# Patient Record
Sex: FEMALE | Race: BLACK OR AFRICAN AMERICAN | NOT HISPANIC OR LATINO | URBAN - METROPOLITAN AREA
[De-identification: names, ages, dates, MRNs, and addresses within clinical notes are randomized per-mention and may not be internally consistent; named-entity substitution may affect disease eponyms.]

---

## 2022-10-27 ENCOUNTER — INPATIENT (INPATIENT)
Facility: HOSPITAL | Age: 87
LOS: 5 days | Discharge: NURSING, REHAB OR LONG-TERM CARE | DRG: 682 | End: 2022-11-02
Attending: INTERNAL MEDICINE | Admitting: INTERNAL MEDICINE
Payer: COMMERCIAL

## 2022-10-27 VITALS
HEIGHT: 60.5 IN | WEIGHT: 182.1 LBS | OXYGEN SATURATION: 93 % | TEMPERATURE: 98 F | SYSTOLIC BLOOD PRESSURE: 114 MMHG | HEART RATE: 69 BPM | DIASTOLIC BLOOD PRESSURE: 69 MMHG | RESPIRATION RATE: 20 BRPM

## 2022-10-27 DIAGNOSIS — R41.82 ALTERED MENTAL STATUS, UNSPECIFIED: ICD-10-CM

## 2022-10-27 LAB
ALBUMIN SERPL ELPH-MCNC: 1.3 G/DL — LOW (ref 3.3–5)
ALBUMIN SERPL ELPH-MCNC: 3.1 G/DL — LOW (ref 3.3–5)
ALP SERPL-CCNC: 37 U/L — LOW (ref 40–120)
ALP SERPL-CCNC: 83 U/L — SIGNIFICANT CHANGE UP (ref 40–120)
ALT FLD-CCNC: 8 U/L — LOW (ref 10–45)
ALT FLD-CCNC: <5 U/L — LOW (ref 10–45)
ANION GAP SERPL CALC-SCNC: 11 MMOL/L — SIGNIFICANT CHANGE UP (ref 5–17)
ANION GAP SERPL CALC-SCNC: 8 MMOL/L — SIGNIFICANT CHANGE UP (ref 5–17)
APPEARANCE UR: CLEAR — SIGNIFICANT CHANGE UP
AST SERPL-CCNC: 17 U/L — SIGNIFICANT CHANGE UP (ref 10–40)
AST SERPL-CCNC: 9 U/L — LOW (ref 10–40)
BACTERIA # UR AUTO: NEGATIVE — SIGNIFICANT CHANGE UP
BASE EXCESS BLDV CALC-SCNC: 3.3 MMOL/L — HIGH (ref -2–3)
BASOPHILS # BLD AUTO: 0.05 K/UL — SIGNIFICANT CHANGE UP (ref 0–0.2)
BASOPHILS NFR BLD AUTO: 0.9 % — SIGNIFICANT CHANGE UP (ref 0–2)
BILIRUB SERPL-MCNC: 0.2 MG/DL — SIGNIFICANT CHANGE UP (ref 0.2–1.2)
BILIRUB SERPL-MCNC: <0.1 MG/DL — LOW (ref 0.2–1.2)
BILIRUB UR-MCNC: NEGATIVE — SIGNIFICANT CHANGE UP
BUN SERPL-MCNC: 32 MG/DL — HIGH (ref 7–23)
BUN SERPL-MCNC: 57 MG/DL — HIGH (ref 7–23)
CA-I SERPL-SCNC: 1.3 MMOL/L — SIGNIFICANT CHANGE UP (ref 1.15–1.33)
CALCIUM SERPL-MCNC: 4.4 MG/DL — CRITICAL LOW (ref 8.4–10.5)
CALCIUM SERPL-MCNC: 9.7 MG/DL — SIGNIFICANT CHANGE UP (ref 8.4–10.5)
CHLORIDE BLDV-SCNC: 98 MMOL/L — SIGNIFICANT CHANGE UP (ref 96–108)
CHLORIDE SERPL-SCNC: 122 MMOL/L — HIGH (ref 96–108)
CHLORIDE SERPL-SCNC: 97 MMOL/L — SIGNIFICANT CHANGE UP (ref 96–108)
CO2 BLDV-SCNC: 32 MMOL/L — HIGH (ref 22–26)
CO2 SERPL-SCNC: 14 MMOL/L — LOW (ref 22–31)
CO2 SERPL-SCNC: 26 MMOL/L — SIGNIFICANT CHANGE UP (ref 22–31)
COLOR SPEC: SIGNIFICANT CHANGE UP
CREAT SERPL-MCNC: 1.04 MG/DL — SIGNIFICANT CHANGE UP (ref 0.5–1.3)
CREAT SERPL-MCNC: 2.02 MG/DL — HIGH (ref 0.5–1.3)
DIFF PNL FLD: NEGATIVE — SIGNIFICANT CHANGE UP
EGFR: 23 ML/MIN/1.73M2 — LOW
EGFR: 51 ML/MIN/1.73M2 — LOW
EOSINOPHIL # BLD AUTO: 0 K/UL — SIGNIFICANT CHANGE UP (ref 0–0.5)
EOSINOPHIL NFR BLD AUTO: 0 % — SIGNIFICANT CHANGE UP (ref 0–6)
EPI CELLS # UR: 3 /HPF — SIGNIFICANT CHANGE UP
GAS PNL BLDV: 133 MMOL/L — LOW (ref 136–145)
GAS PNL BLDV: SIGNIFICANT CHANGE UP
GAS PNL BLDV: SIGNIFICANT CHANGE UP
GLUCOSE BLDV-MCNC: 95 MG/DL — SIGNIFICANT CHANGE UP (ref 70–99)
GLUCOSE SERPL-MCNC: 108 MG/DL — HIGH (ref 70–99)
GLUCOSE SERPL-MCNC: 52 MG/DL — CRITICAL LOW (ref 70–99)
GLUCOSE UR QL: NEGATIVE — SIGNIFICANT CHANGE UP
HCO3 BLDV-SCNC: 30 MMOL/L — HIGH (ref 22–29)
HCT VFR BLD CALC: 31.1 % — LOW (ref 34.5–45)
HCT VFR BLD CALC: 32.1 % — LOW (ref 34.5–45)
HCT VFR BLDA CALC: 32 % — LOW (ref 34.5–46.5)
HGB BLD CALC-MCNC: 10.5 G/DL — LOW (ref 11.7–16.1)
HGB BLD-MCNC: 10.1 G/DL — LOW (ref 11.5–15.5)
HGB BLD-MCNC: 9.7 G/DL — LOW (ref 11.5–15.5)
HYALINE CASTS # UR AUTO: 1 /LPF — SIGNIFICANT CHANGE UP (ref 0–2)
KETONES UR-MCNC: NEGATIVE — SIGNIFICANT CHANGE UP
LACTATE BLDV-MCNC: 2 MMOL/L — SIGNIFICANT CHANGE UP (ref 0.5–2)
LEUKOCYTE ESTERASE UR-ACNC: ABNORMAL
LYMPHOCYTES # BLD AUTO: 0.65 K/UL — LOW (ref 1–3.3)
LYMPHOCYTES # BLD AUTO: 12.9 % — LOW (ref 13–44)
MANUAL SMEAR VERIFICATION: SIGNIFICANT CHANGE UP
MCHC RBC-ENTMCNC: 31.2 GM/DL — LOW (ref 32–36)
MCHC RBC-ENTMCNC: 31.5 GM/DL — LOW (ref 32–36)
MCHC RBC-ENTMCNC: 34.2 PG — HIGH (ref 27–34)
MCHC RBC-ENTMCNC: 34.3 PG — HIGH (ref 27–34)
MCV RBC AUTO: 108.8 FL — HIGH (ref 80–100)
MCV RBC AUTO: 109.9 FL — HIGH (ref 80–100)
MONOCYTES # BLD AUTO: 0.96 K/UL — HIGH (ref 0–0.9)
MONOCYTES NFR BLD AUTO: 19 % — HIGH (ref 2–14)
NEUTROPHILS # BLD AUTO: 3.39 K/UL — SIGNIFICANT CHANGE UP (ref 1.8–7.4)
NEUTROPHILS NFR BLD AUTO: 67.2 % — SIGNIFICANT CHANGE UP (ref 43–77)
NITRITE UR-MCNC: NEGATIVE — SIGNIFICANT CHANGE UP
NRBC # BLD: 0 /100 WBCS — SIGNIFICANT CHANGE UP (ref 0–0)
PCO2 BLDV: 54 MMHG — HIGH (ref 39–42)
PH BLDV: 7.35 — SIGNIFICANT CHANGE UP (ref 7.32–7.43)
PH UR: 6.5 — SIGNIFICANT CHANGE UP (ref 5–8)
PLAT MORPH BLD: NORMAL — SIGNIFICANT CHANGE UP
PLATELET # BLD AUTO: 315 K/UL — SIGNIFICANT CHANGE UP (ref 150–400)
PLATELET # BLD AUTO: 338 K/UL — SIGNIFICANT CHANGE UP (ref 150–400)
PO2 BLDV: 25 MMHG — SIGNIFICANT CHANGE UP (ref 25–45)
POTASSIUM BLDV-SCNC: 4.1 MMOL/L — SIGNIFICANT CHANGE UP (ref 3.5–5.1)
POTASSIUM SERPL-MCNC: 2.1 MMOL/L — CRITICAL LOW (ref 3.5–5.3)
POTASSIUM SERPL-MCNC: 4.4 MMOL/L — SIGNIFICANT CHANGE UP (ref 3.5–5.3)
POTASSIUM SERPL-SCNC: 2.1 MMOL/L — CRITICAL LOW (ref 3.5–5.3)
POTASSIUM SERPL-SCNC: 4.4 MMOL/L — SIGNIFICANT CHANGE UP (ref 3.5–5.3)
PROT SERPL-MCNC: 3.3 G/DL — LOW (ref 6–8.3)
PROT SERPL-MCNC: 6.9 G/DL — SIGNIFICANT CHANGE UP (ref 6–8.3)
PROT UR-MCNC: SIGNIFICANT CHANGE UP
RAPID RVP RESULT: SIGNIFICANT CHANGE UP
RBC # BLD: 2.83 M/UL — LOW (ref 3.8–5.2)
RBC # BLD: 2.95 M/UL — LOW (ref 3.8–5.2)
RBC # FLD: 13.6 % — SIGNIFICANT CHANGE UP (ref 10.3–14.5)
RBC # FLD: 13.8 % — SIGNIFICANT CHANGE UP (ref 10.3–14.5)
RBC BLD AUTO: SIGNIFICANT CHANGE UP
RBC CASTS # UR COMP ASSIST: 5 /HPF — HIGH (ref 0–4)
SAO2 % BLDV: 31.9 % — LOW (ref 67–88)
SARS-COV-2 RNA SPEC QL NAA+PROBE: SIGNIFICANT CHANGE UP
SODIUM SERPL-SCNC: 134 MMOL/L — LOW (ref 135–145)
SODIUM SERPL-SCNC: 144 MMOL/L — SIGNIFICANT CHANGE UP (ref 135–145)
SP GR SPEC: 1.01 — SIGNIFICANT CHANGE UP (ref 1.01–1.02)
TROPONIN T, HIGH SENSITIVITY RESULT: 23 NG/L — SIGNIFICANT CHANGE UP (ref 0–51)
UROBILINOGEN FLD QL: NEGATIVE — SIGNIFICANT CHANGE UP
WBC # BLD: 5.04 K/UL — SIGNIFICANT CHANGE UP (ref 3.8–10.5)
WBC # BLD: 5.57 K/UL — SIGNIFICANT CHANGE UP (ref 3.8–10.5)
WBC # FLD AUTO: 5.04 K/UL — SIGNIFICANT CHANGE UP (ref 3.8–10.5)
WBC # FLD AUTO: 5.57 K/UL — SIGNIFICANT CHANGE UP (ref 3.8–10.5)
WBC UR QL: 3 /HPF — SIGNIFICANT CHANGE UP (ref 0–5)

## 2022-10-27 PROCEDURE — 93010 ELECTROCARDIOGRAM REPORT: CPT

## 2022-10-27 PROCEDURE — 99223 1ST HOSP IP/OBS HIGH 75: CPT

## 2022-10-27 PROCEDURE — 70450 CT HEAD/BRAIN W/O DYE: CPT | Mod: 26,MA

## 2022-10-27 PROCEDURE — 71045 X-RAY EXAM CHEST 1 VIEW: CPT | Mod: 26

## 2022-10-27 PROCEDURE — 99285 EMERGENCY DEPT VISIT HI MDM: CPT

## 2022-10-27 RX ORDER — DEXTROSE 50 % IN WATER 50 %
50 SYRINGE (ML) INTRAVENOUS ONCE
Refills: 0 | Status: DISCONTINUED | OUTPATIENT
Start: 2022-10-27 | End: 2022-10-27

## 2022-10-27 RX ORDER — POTASSIUM CHLORIDE 20 MEQ
10 PACKET (EA) ORAL
Refills: 0 | Status: DISCONTINUED | OUTPATIENT
Start: 2022-10-27 | End: 2022-10-27

## 2022-10-27 RX ORDER — POTASSIUM CHLORIDE 20 MEQ
40 PACKET (EA) ORAL ONCE
Refills: 0 | Status: DISCONTINUED | OUTPATIENT
Start: 2022-10-27 | End: 2022-10-27

## 2022-10-27 RX ORDER — MAGNESIUM SULFATE 500 MG/ML
2 VIAL (ML) INJECTION ONCE
Refills: 0 | Status: DISCONTINUED | OUTPATIENT
Start: 2022-10-27 | End: 2022-10-27

## 2022-10-27 RX ORDER — INFLUENZA VIRUS VACCINE 15; 15; 15; 15 UG/.5ML; UG/.5ML; UG/.5ML; UG/.5ML
0.7 SUSPENSION INTRAMUSCULAR ONCE
Refills: 0 | Status: DISCONTINUED | OUTPATIENT
Start: 2022-10-27 | End: 2022-11-02

## 2022-10-27 NOTE — ED PROVIDER NOTE - PHYSICAL EXAMINATION
GENERAL: Not in acute distress, non-toxic appearing  HEAD: normocephalic, atraumatic  HEENT: PERRLA, EOMI, normal conjunctiva, oral mucosa moist, neck supple  CARDIAC: regular rate and rhythm, normal S1 and S2,  no appreciable murmurs  PULM: clear to ascultation bilaterally, no crackles, rales, rhonchi, or wheezing  GI: abdomen nondistended, soft, nontender, no guarding or rebound tenderness  : no suprapubic tenderness  NEURO: alert and oriented x 3, normal speech, no focal motor or sensory deficits, gait normal, no gross neurologic deficit  MSK: No visible deformities, no peripheral edema, calf tenderness/redness/swelling  SKIN: No visible rashes, dry, well-perfused  PSYCH: appropriate mood and affect

## 2022-10-27 NOTE — ED ADULT TRIAGE NOTE - CHIEF COMPLAINT QUOTE
As per EMS, pt was at [facility] after having [facility] and pt was seen shaking and they were concerned pt was having a seizure. EMS states only pt's leg was shaking

## 2022-10-27 NOTE — ED ADULT NURSE NOTE - NSIMPLEMENTINTERV_GEN_ALL_ED
Implemented All Fall with Harm Risk Interventions:  [place] to call system. Call bell, personal items and telephone within reach. Instruct patient to call for assistance. Room bathroom lighting operational. Non-slip footwear when patient is off stretcher. Physically safe environment: no spills, clutter or unnecessary equipment. Stretcher in lowest position, wheels locked, appropriate side rails in place. Provide visual cue, wrist band, yellow gown, etc. Monitor gait and stability. Monitor for mental status changes and reorient to person, place, and time. Review medications for side effects contributing to fall risk. Reinforce activity limits and safety measures with patient and family. Provide visual clues: red socks.

## 2022-10-27 NOTE — ED PROVIDER NOTE - CLINICAL SUMMARY MEDICAL DECISION MAKING FREE TEXT BOX
89 year old female with hx of paroxysmal AFib with a pacemaker, CHF, PAD, aortic valve stenosis, and recent knee surgery on 10/8 comes into the ED with her daughter from her facility where she was found to have "seizure-like activity". Pt did not seem to have a postictal state and neuro exam here is not significant. Pt's abd pain seems to have been present for the past ~1week. Will get a CBC, CMP, UA, VBG, CT head, UA, Urine culture, chest xray. If work up is negative, Pt can be dc back to facility. 89 year old female with hx of paroxysmal AFib with a pacemaker CAD , CHF, PAD, aortic valve stenosis, and recent knee surgery on 10/8 comes into the ED with her daughter from her facility where she was found to have "seizure-like activity". Pt did not seem to have a postictal state and neuro exam here is not significant. Pt's abd pain seems to have been present for the past ~1week.  concern for TIA vs  syncope Will get a CBC, CMP, UA, VBG, CT head, UA, Urine culture, chest xray. If work up is negative, Pt can be dc back to facility.ZR

## 2022-10-27 NOTE — H&P ADULT - HISTORY OF PRESENT ILLNESS
89 year old female with hx of paroxysmal AFib with a pacemaker, CHF, PAD, aortic valve stenosis, and recent knee surgery on 10/8  89 year old female with hx of paroxysmal AFib, HFrEF, sinus arrest s/p pacemaker, PAD, CAD stage 3a, aortic valve stenosis, and recent hospitalization for infected prosthetic knee joint who presents from rehab facility for          Per outpatient records (see [facility]), the patient was hospitalized earlier this month after a syncopal episode, and was found to have infected prosthetic knee joint requiring left knee I&D with poly exchange. She was treated with a course of vanc and zosyn, eventually discharged to [facility]. 89 year old female with hx of paroxysmal AFib, HFrEF, sinus arrest s/p pacemaker, PAD, aortic valve stenosis, and recent hospitalization for infected prosthetic knee joint who presents from [facility] rehab facility for concern of seizure activity. According to provider available at [facility] for rehab, the patient was sent to the hospital after an episode of limited responsiveness associated with jerking movements of the limbs. This episode lasted for about five minutes.     The patient herself did not recall the events that lead to her admissions. She states she was at the rehab facility, waiting to be transported to an outpatient appointment when she was brought to the hospital instead. She repeated states that 'it wasn't fair' that she was brought here instead without her personal belongings. She denies losing consciousness at any point, no confusion, no headaches.     The patient was AAOx2 at the time of evaluation. She could not recall the name of her rehab facility and didn't recall the name of the hospital where she recently had knee I&D. Per rehab facility, the patient is typically AAOx3. I attempted to call the patient's daughter as well for collateral; however, phone went straight to voicemail.    Per outpatient records (see [facility]), the patient was hospitalized earlier this month after a syncopal episode, and was found to have infected prosthetic knee joint requiring left knee I&D with poly exchange. She was treated with a course of vanc and zosyn, eventually discharged to [facility]. 89 year old female with hx of paroxysmal AFib, HFrEF, sinus arrest s/p pacemaker, PAD, aortic valve stenosis, and recent hospitalization for infected prosthetic knee joint who presents from [facility] rehab facility for concern of seizure activity. According to provider available at [facility] for rehab, the patient was sent to the hospital after an episode of limited responsiveness associated with jerking movements of the limbs. This episode lasted for about five minutes.     The patient herself did not recall the events that lead to her admissions. She states she was at the rehab facility, waiting to be transported to an outpatient appointment when she was brought to the hospital instead. She repeated states that 'it wasn't fair' that she was brought here instead without her personal belongings. She denies losing consciousness at any point, no confusion, no headaches.     The patient was AAOx2 at the time of evaluation. She could not recall the name of her rehab facility and didn't recall the name of the hospital where she recently had knee I&D. Per [facility] rehab facility, the patient is typically AAOx3. I attempted to call the patient's daughter as well for collateral; however, phone went straight to voicemail.    Per outpatient records (see [facility]), the patient was hospitalized earlier this month after a syncopal episode, and was found to have infected prosthetic knee joint requiring left knee I&D with poly exchange. She was treated with a course of vanc and zosyn, eventually discharged to [facility].

## 2022-10-27 NOTE — ED PROVIDER NOTE - NS ED ROS FT
GENERAL: No fever, chills  HEENT: No cough, congestion,   CARDIAC: No chest pain, palpitations, lightheadedness, syncope  PULM: No dyspnea, cough, wheezing   GI: +abdominal pain, No nausea, vomiting, diarrhea, constipation, melena, hematochezia  : No urinary dysuria, frequency, incontinence, hematuria  NEURO: +headache, motor weakness, sensory changes  MSK: + Left knee pain, + left knee surgery scar, No back pain,   SKIN: no rashes, hives, urticaria  HEME: no active bleeding, bruising

## 2022-10-27 NOTE — H&P ADULT - PROBLEM SELECTOR PLAN 2
- will start abx; waiting for med list from rehab facility  - obtain lower extremity duplex - will start cefazolin  - obtain lower extremity duplex for unilateral swelling

## 2022-10-27 NOTE — H&P ADULT - SKIN COMMENTS
+left distal leg erythema with small acutely infected ulcer. The left knee is swollen but not erythematous.

## 2022-10-27 NOTE — ED PROVIDER NOTE - PROGRESS NOTE DETAILS
Getting repeat CBC and CMP as they may have been hemodiluted. Repeat finger stick was 108 and potassium on the VBG was wnl.

## 2022-10-27 NOTE — H&P ADULT - PROBLEM SELECTOR PLAN 4
- please obtain outpt records to confirm baseline creatinine this morning  - caution with nephrotoxic agents

## 2022-10-27 NOTE — H&P ADULT - PROBLEM SELECTOR PLAN 1
- obtain B12 and folate  - will order EEG  - please consult neurology this morning regarding concern for seizure activity at [facility] - will order EEG  - obtain B12 and folate  - please consult neurology this morning regarding concern for seizure activity at [facility]

## 2022-10-27 NOTE — H&P ADULT - NSHPPHYSICALEXAM_GEN_ALL_CORE
Vital Signs Last 24 Hrs  T(C): 36.7 (27 Oct 2022 22:21), Max: 36.7 (27 Oct 2022 16:37)  T(F): 98 (27 Oct 2022 22:21), Max: 98 (27 Oct 2022 16:37)  HR: 69 (27 Oct 2022 22:21) (69 - 69)  BP: 100/56 (27 Oct 2022 22:21) (100/56 - 114/69)  BP(mean): --  RR: 20 (27 Oct 2022 22:21) (20 - 20)  SpO2: 97% (27 Oct 2022 22:21) (93% - 97%)    Parameters below as of 27 Oct 2022 22:21  Patient On (Oxygen Delivery Method): room air

## 2022-10-27 NOTE — H&P ADULT - PROBLEM SELECTOR PLAN 3
- please obtain outpt records to confirm baseline creatinine this morning Per outpatient records (see Health system), the patient was hospitalized earlier this month after a syncopal episode, and was found to have infected prosthetic knee joint requiring left knee I&D with poly exchange. She was treated with a course of vanc and zosyn, eventually discharged to [facility].  - pt has a PICC line. Unclear whether she should still be getting abx specific for her recent history of infected prosthetic knee  - I reached out to [facility] rehab overnight for medication list. They stated they will fax the list over but >1 hour past and med list is not here. I tried to call them again but there is no answer.   - please try to get med list this morning

## 2022-10-27 NOTE — ED ADULT TRIAGE NOTE - PAIN: PRESENCE, MLM
Instructions: This plan will send the code FBSE to the PM system.  DO NOT or CHANGE the price. Price (Do Not Change): 0.00 Detail Level: Simple complains of pain/discomfort

## 2022-10-27 NOTE — H&P ADULT - NSHPSOCIALHISTORY_GEN_ALL_CORE
At [facility] rehab facility s/p recent hospitalization for infected left prosthetic knee.

## 2022-10-27 NOTE — ED ADULT NURSE NOTE - OBJECTIVE STATEMENT
89Y w/pmh of HTN, lt knee replacement presents to ED from [facility] for "seizure like activity". Pt is at [facility]'s s/p knee surgery on her left knee which was previously replaced. As per [facility]'s staff, pt was unresponsive with only her left leg shaking. The amount of time the pt was unresponsive is unknown. Pt has no known history of seizures. As per EMS, pt was hypotensive 78/51. After fluid administration, patient was 100/54 as per EMS. Pt is 103/59 in the ED. Pt is AOx3 but appears lethargic. As per family, she has been like this since being hospitalized for her knee surgery on 10/6. Family also mentioned moments of confusion where she calls people by the wrong name/ relationship. Pt endorses a headache and abdominal pain in RLQ 5/10 pain. Patient has had diarrhea for the last two days. Pt also says she has moments of SOB. Pt denies chest pain, nausea, vomiting,  symptoms. Pt has clear lung sounds and peripheral pulses are present. Edema to the left knee surrounding surgical site and left ankle is wrapped around a healed ulcer. Pt is not ambulatory in the ED.

## 2022-10-27 NOTE — H&P ADULT - NSHPLABSRESULTS_GEN_ALL_CORE
LABS:                         10.1   5.57  )-----------( 338      ( 27 Oct 2022 20:14 )             32.1     10-27    134<L>  |  97  |  57<H>  ----------------------------<  108<H>  4.4   |  26  |  2.02<H>    Ca    9.7      27 Oct 2022 20:14    TPro  6.9  /  Alb  3.1<L>  /  TBili  0.2  /  DBili  x   /  AST  17  /  ALT  8<L>  /  AlkPhos  83  10-27      Urinalysis Basic - ( 27 Oct 2022 18:14 )    Color: Light Yellow / Appearance: Clear / S.012 / pH: x  Gluc: x / Ketone: Negative  / Bili: Negative / Urobili: Negative   Blood: x / Protein: Trace / Nitrite: Negative   Leuk Esterase: Small / RBC: 5 /hpf / WBC 3 /HPF   Sq Epi: x / Non Sq Epi: 3 /hpf / Bacteria: Negative              Records reviewed from prior hospitalization.  Labs reviewed remarkable for - macrocytic anemia at baseline. BUN/Cr of 57/2.02 (no prior labs to compare). Lactate 2.0.  EKG personally reviewed   CXR personally reviewed - trace b/l pleural effusions. +PICC. LABS:                         10.1   5.57  )-----------( 338      ( 27 Oct 2022 20:14 )             32.1     10-27    134<L>  |  97  |  57<H>  ----------------------------<  108<H>  4.4   |  26  |  2.02<H>    Ca    9.7      27 Oct 2022 20:14    TPro  6.9  /  Alb  3.1<L>  /  TBili  0.2  /  DBili  x   /  AST  17  /  ALT  8<L>  /  AlkPhos  83  10-27      Urinalysis Basic - ( 27 Oct 2022 18:14 )    Color: Light Yellow / Appearance: Clear / S.012 / pH: x  Gluc: x / Ketone: Negative  / Bili: Negative / Urobili: Negative   Blood: x / Protein: Trace / Nitrite: Negative   Leuk Esterase: Small / RBC: 5 /hpf / WBC 3 /HPF   Sq Epi: x / Non Sq Epi: 3 /hpf / Bacteria: Negative              Records reviewed from prior hospitalization.  Labs reviewed remarkable for - macrocytic anemia at baseline. BUN/Cr of 57/2.02 (no prior labs to compare). Lactate 2.0.  CXR personally reviewed - trace b/l pleural effusions. +PICC.

## 2022-10-27 NOTE — H&P ADULT - ASSESSMENT
89 year old female with hx of paroxysmal AFib, HFrEF, sinus arrest s/p pacemaker, PAD, aortic valve stenosis, and recent hospitalization for infected prosthetic knee joint who presents from [facility]hab facility for concern of seizure activity. The patient has no recollection of the event, states she did not loose consciousness but according to [facility], the patient was observed with limb jerking motions and minimal responsiveness.

## 2022-10-27 NOTE — PATIENT PROFILE ADULT - FALL HARM RISK - HARM RISK INTERVENTIONS
Assistance with ambulation/Assistance OOB with selected safe patient handling equipment/Communicate Risk of Fall with Harm to all staff/Discuss with provider need for PT consult/Monitor gait and stability/Reinforce activity limits and safety measures with patient and family/Tailored Fall Risk Interventions/Visual Cue: Yellow wristband and red socks/Bed in lowest position, wheels locked, appropriate side rails in place/Call bell, personal items and telephone in reach/Instruct patient to call for assistance before getting out of bed or chair/Non-slip footwear when patient is out of bed/[place] to call system/Physically safe environment - no spills, clutter or unnecessary equipment/Purposeful Proactive Rounding/Room/bathroom lighting operational, light cord in reach

## 2022-10-27 NOTE — ED PROVIDER NOTE - OBJECTIVE STATEMENT
Knee 10/8/2022    Paroxysmal atrial fibrillation    Pacemaker    Left leg injury    Syncope    Fall    Heart failure, diastolic, acute    Mixed hyperlipidemia    PAD (peripheral artery disease)    Nonrheumatic aortic valve stenosis 89 year old female with hx of paroxysmal AFib with a pacemaker, CHF, PAD, aortic valve stenosis, and recent knee replacement surgery on 10/8    Knee 10/8/2022    Paroxysmal atrial fibrillation    Pacemaker    Left leg injury    Syncope    Fall 89 year old female with hx of paroxysmal AFib with a pacemaker, CHF, PAD, aortic valve stenosis, and recent knee surgery on 10/8 comes into the ED with her daughter from her facility where she was found to have "seizure-like activity". Daughter says she was told the Pt was sitting in her wheelchair when her leg started to shake and she was unresponsive. The Pt does not remember this and states last she remembers was waking up from this episode and wondering why she was asleep. Currently in the ED, she has a HA and some mild abd pain. Over the past 3-4 days she has had some diarrhea. She does not have any changes in vision, the daughter states she is acting the same as her baseline, She denies any chest pain, SOB, recent fevers, chills, abd pain, n/v/.

## 2022-10-27 NOTE — ED ADULT NURSE REASSESSMENT NOTE - NS ED NURSE REASSESS COMMENT FT1
Patient found with urinary incontinence and touching her feces. Patient screaming at staff while spreading feces. Patient cleaned entirely using soapy water. Patient is alert and oriented. When ask why she was touching her feces the patient stated "I had to" Patient educated on the need for hygenic and personal safety. All new linen applied including gown and multiple blankets. Clean allegra and brief applied. Skin care provided. Bed in lowest position, side rails in place, red socks applied, call bell with in reach. Pt repositioned in the stretcher for comfort. Patient found with urinary incontinence and touching her feces. Patient screaming at staff while spreading feces. Patient cleaned entirely using soapy water. Patient is alert and oriented to person place and time how ever is asking repetitive questions. When ask why she was touching her feces the patient stated "I had to" Patient educated on the need for hygenic and personal safety. All new linen applied including gown and multiple blankets. Clean allegra and brief applied. Skin care provided. Bed in lowest position, side rails in place, red socks applied, call bell with in reach. Pt repositioned in the stretcher for comfort.

## 2022-10-27 NOTE — ED ADULT NURSE REASSESSMENT NOTE - NS ED NURSE REASSESS COMMENT FT1
Pt resting comfortably in stretcher. Updated pt on plan of care and provided pt with dinner. Awaiting bed assignment.

## 2022-10-28 DIAGNOSIS — R79.89 OTHER SPECIFIED ABNORMAL FINDINGS OF BLOOD CHEMISTRY: ICD-10-CM

## 2022-10-28 DIAGNOSIS — Z98.890 OTHER SPECIFIED POSTPROCEDURAL STATES: Chronic | ICD-10-CM

## 2022-10-28 DIAGNOSIS — Z98.890 OTHER SPECIFIED POSTPROCEDURAL STATES: ICD-10-CM

## 2022-10-28 DIAGNOSIS — Z29.9 ENCOUNTER FOR PROPHYLACTIC MEASURES, UNSPECIFIED: ICD-10-CM

## 2022-10-28 DIAGNOSIS — R41.89 OTHER SYMPTOMS AND SIGNS INVOLVING COGNITIVE FUNCTIONS AND AWARENESS: ICD-10-CM

## 2022-10-28 DIAGNOSIS — R09.89 OTHER SPECIFIED SYMPTOMS AND SIGNS INVOLVING THE CIRCULATORY AND RESPIRATORY SYSTEMS: ICD-10-CM

## 2022-10-28 DIAGNOSIS — L03.119 CELLULITIS OF UNSPECIFIED PART OF LIMB: ICD-10-CM

## 2022-10-28 LAB
ANION GAP SERPL CALC-SCNC: 11 MMOL/L — SIGNIFICANT CHANGE UP (ref 5–17)
BUN SERPL-MCNC: 52 MG/DL — HIGH (ref 7–23)
CALCIUM SERPL-MCNC: 10.2 MG/DL — SIGNIFICANT CHANGE UP (ref 8.4–10.5)
CHLORIDE SERPL-SCNC: 100 MMOL/L — SIGNIFICANT CHANGE UP (ref 96–108)
CO2 SERPL-SCNC: 27 MMOL/L — SIGNIFICANT CHANGE UP (ref 22–31)
CREAT SERPL-MCNC: 1.6 MG/DL — HIGH (ref 0.5–1.3)
CULTURE RESULTS: SIGNIFICANT CHANGE UP
EGFR: 30 ML/MIN/1.73M2 — LOW
GLUCOSE SERPL-MCNC: 153 MG/DL — HIGH (ref 70–99)
HCT VFR BLD CALC: 30.7 % — LOW (ref 34.5–45)
HGB BLD-MCNC: 9.9 G/DL — LOW (ref 11.5–15.5)
MAGNESIUM SERPL-MCNC: 2.2 MG/DL — SIGNIFICANT CHANGE UP (ref 1.6–2.6)
MCHC RBC-ENTMCNC: 32.2 GM/DL — SIGNIFICANT CHANGE UP (ref 32–36)
MCHC RBC-ENTMCNC: 34 PG — SIGNIFICANT CHANGE UP (ref 27–34)
MCV RBC AUTO: 105.5 FL — HIGH (ref 80–100)
NRBC # BLD: 0 /100 WBCS — SIGNIFICANT CHANGE UP (ref 0–0)
PHOSPHATE SERPL-MCNC: 3.4 MG/DL — SIGNIFICANT CHANGE UP (ref 2.5–4.5)
PLATELET # BLD AUTO: 375 K/UL — SIGNIFICANT CHANGE UP (ref 150–400)
POTASSIUM SERPL-MCNC: 4.4 MMOL/L — SIGNIFICANT CHANGE UP (ref 3.5–5.3)
POTASSIUM SERPL-SCNC: 4.4 MMOL/L — SIGNIFICANT CHANGE UP (ref 3.5–5.3)
RBC # BLD: 2.91 M/UL — LOW (ref 3.8–5.2)
RBC # FLD: 13.4 % — SIGNIFICANT CHANGE UP (ref 10.3–14.5)
SODIUM SERPL-SCNC: 138 MMOL/L — SIGNIFICANT CHANGE UP (ref 135–145)
SPECIMEN SOURCE: SIGNIFICANT CHANGE UP
TSH SERPL-MCNC: 1.86 UIU/ML — SIGNIFICANT CHANGE UP (ref 0.27–4.2)
VIT B12 SERPL-MCNC: >2000 PG/ML — HIGH (ref 232–1245)
WBC # BLD: 4.19 K/UL — SIGNIFICANT CHANGE UP (ref 3.8–10.5)
WBC # FLD AUTO: 4.19 K/UL — SIGNIFICANT CHANGE UP (ref 3.8–10.5)

## 2022-10-28 PROCEDURE — 95816 EEG AWAKE AND DROWSY: CPT | Mod: 26

## 2022-10-28 PROCEDURE — 93970 EXTREMITY STUDY: CPT | Mod: 26

## 2022-10-28 RX ORDER — ACETAMINOPHEN 500 MG
3 TABLET ORAL
Qty: 0 | Refills: 0 | DISCHARGE

## 2022-10-28 RX ORDER — METOPROLOL TARTRATE 50 MG
1 TABLET ORAL
Qty: 0 | Refills: 0 | DISCHARGE

## 2022-10-28 RX ORDER — LISINOPRIL 2.5 MG/1
10 TABLET ORAL DAILY
Refills: 0 | Status: DISCONTINUED | OUTPATIENT
Start: 2022-10-28 | End: 2022-10-28

## 2022-10-28 RX ORDER — ASPIRIN/CALCIUM CARB/MAGNESIUM 324 MG
1 TABLET ORAL
Qty: 0 | Refills: 0 | DISCHARGE

## 2022-10-28 RX ORDER — CHOLECALCIFEROL (VITAMIN D3) 125 MCG
1 CAPSULE ORAL
Qty: 0 | Refills: 0 | DISCHARGE

## 2022-10-28 RX ORDER — ONDANSETRON 8 MG/1
4 TABLET, FILM COATED ORAL EVERY 8 HOURS
Refills: 0 | Status: DISCONTINUED | OUTPATIENT
Start: 2022-10-28 | End: 2022-11-02

## 2022-10-28 RX ORDER — MULTIVIT-MIN/FERROUS GLUCONATE 9 MG/15 ML
1 LIQUID (ML) ORAL
Qty: 0 | Refills: 0 | DISCHARGE

## 2022-10-28 RX ORDER — CEFAZOLIN SODIUM 1 G
VIAL (EA) INJECTION
Refills: 0 | Status: DISCONTINUED | OUTPATIENT
Start: 2022-10-28 | End: 2022-10-29

## 2022-10-28 RX ORDER — FAMOTIDINE 10 MG/ML
1 INJECTION INTRAVENOUS
Qty: 0 | Refills: 0 | DISCHARGE

## 2022-10-28 RX ORDER — POLYETHYLENE GLYCOL 3350 17 G/17G
17 POWDER, FOR SOLUTION ORAL
Qty: 0 | Refills: 0 | DISCHARGE

## 2022-10-28 RX ORDER — ACETAMINOPHEN 500 MG
650 TABLET ORAL EVERY 6 HOURS
Refills: 0 | Status: DISCONTINUED | OUTPATIENT
Start: 2022-10-28 | End: 2022-11-02

## 2022-10-28 RX ORDER — CEFAZOLIN SODIUM 1 G
1000 VIAL (EA) INJECTION ONCE
Refills: 0 | Status: COMPLETED | OUTPATIENT
Start: 2022-10-28 | End: 2022-10-28

## 2022-10-28 RX ORDER — LOPERAMIDE HCL 2 MG
1 TABLET ORAL
Qty: 0 | Refills: 0 | DISCHARGE

## 2022-10-28 RX ORDER — CEFAZOLIN SODIUM 1 G
1000 VIAL (EA) INJECTION EVERY 8 HOURS
Refills: 0 | Status: DISCONTINUED | OUTPATIENT
Start: 2022-10-28 | End: 2022-10-29

## 2022-10-28 RX ORDER — ASCORBIC ACID 60 MG
1 TABLET,CHEWABLE ORAL
Qty: 0 | Refills: 0 | DISCHARGE

## 2022-10-28 RX ORDER — ASPIRIN/CALCIUM CARB/MAGNESIUM 324 MG
81 TABLET ORAL DAILY
Refills: 0 | Status: DISCONTINUED | OUTPATIENT
Start: 2022-10-28 | End: 2022-11-02

## 2022-10-28 RX ORDER — METOPROLOL TARTRATE 50 MG
50 TABLET ORAL DAILY
Refills: 0 | Status: DISCONTINUED | OUTPATIENT
Start: 2022-10-28 | End: 2022-11-02

## 2022-10-28 RX ORDER — HEPARIN SODIUM 5000 [USP'U]/ML
5000 INJECTION INTRAVENOUS; SUBCUTANEOUS EVERY 8 HOURS
Refills: 0 | Status: DISCONTINUED | OUTPATIENT
Start: 2022-10-28 | End: 2022-11-02

## 2022-10-28 RX ORDER — LANOLIN ALCOHOL/MO/W.PET/CERES
3 CREAM (GRAM) TOPICAL AT BEDTIME
Refills: 0 | Status: DISCONTINUED | OUTPATIENT
Start: 2022-10-28 | End: 2022-11-02

## 2022-10-28 RX ADMIN — HEPARIN SODIUM 5000 UNIT(S): 5000 INJECTION INTRAVENOUS; SUBCUTANEOUS at 23:17

## 2022-10-28 RX ADMIN — Medication 100 MILLIGRAM(S): at 13:45

## 2022-10-28 RX ADMIN — Medication 100 MILLIGRAM(S): at 05:24

## 2022-10-28 RX ADMIN — HEPARIN SODIUM 5000 UNIT(S): 5000 INJECTION INTRAVENOUS; SUBCUTANEOUS at 05:24

## 2022-10-28 RX ADMIN — HEPARIN SODIUM 5000 UNIT(S): 5000 INJECTION INTRAVENOUS; SUBCUTANEOUS at 13:44

## 2022-10-28 RX ADMIN — Medication 100 MILLIGRAM(S): at 23:17

## 2022-10-28 NOTE — EEG REPORT - NS EEG TEXT BOX
YAO MURRAY [id number] 89y (22-Aug-1931)F  Admitting MD: Dr. Don Hassan    Study Date: 10-28-22    --------------------------------------------------------------------------------------------------  History:  CC/ HPI Patient is a 89y old  Female who presents with a chief complaint of concern for seizure activity (27 Oct 2022 23:52)    ceFAZolin   IVPB      ceFAZolin   IVPB 1000 milliGRAM(s) IV Intermittent every 8 hours  heparin   Injectable 5000 Unit(s) SubCutaneous every 8 hours  influenza  Vaccine (HIGH DOSE) 0.7 milliLiter(s) IntraMuscular once    --------------------------------------------------------------------------------------------------  Study Interpretation:    [[[Abbreviation Key:  PDR=alpha rhythm/posterior dominant rhythm. A-P=anterior posterior gradient.  Amplitude: ‘very low’:<20; ‘low’:20-50; ‘medium’:; ‘high’:>200uV.  Persistence for periodic/rhythmic patterns (% of epoch) ‘rare’:<1%; ‘occasional’:1-10%; ‘frequent’:10-50%; ‘abundant’:50-90%; ‘continuous’:>90%.  Persistence for sporadic discharges: ‘rare’:<1/hr; ‘occasional’:1/min-1/hr; ‘frequent’:>1/min; ‘abundant’:>1/10 sec.  GRDA=generalized rhythmic delta activity; FIRDA=frontal intermittent GRDA; LRDA=lateralized rhythmic delta activity; TIRDA=temporal intermittent rhythmic delta activity;  LPD=PLED=lateralized periodic discharges; GPD=generalized periodic discharges; BiPDs=BiPLEDs=bilateral independent periodic epileptiform discharges; SIRPID=stimulus induced rhythmic, periodic, or ictal appearing discharges; BIRDs=brief potentially ictal rhythmic discharges >4 Hz, lasting .5-10s; PFA (paroxysmal bursts >13 Hz or =8 Hz).  Modifiers: +F=with fast component; +S=with spike component; +R=with rhythmic component.  S-B=burst suppression pattern.  Max=maximal. N1-drowsy; N2-stage II sleep; N3-slow wave sleep. SSS/BETS=small sharp spikes/benign epileptiform transients of sleep. HV=hyperventilation; PS=photic stimulation]]]    FINDINGS:  The background was continuous, spontaneously variable and reactive.  During wakefulness, the posteriorly dominant rhythm consisted of symmetric, well modulated 8.5-9 Hz activity, with an amplitude to 40 uV, that attenuated to eye opening.  Low amplitude central beta was noted in wakefulness.    Background Slowing:  Generalized slowing: none was present.  Focal slowing: none was present.    Sleep Background:  -Drowsiness was characterized by fragmentation, attenuation, and slowing of the background activity.    -N2 was characterized by the presence of vertex waves, symmetric spindles, and K-complexes.    Epileptiform Activity:   No interictal epileptiform discharges were present.    Events:  No clinical events were recorded.  No seizures were recorded.    Activation Procedures:   -Hyperventilation was not performed.    -Photic stimulation was performed and did not elicit any abnormalities.      Artifacts:  Intermittent myogenic and external motion artifacts were noted.    ECG:  The heart rate on single channel ECG at baseline was predominantly near BPM = 60-70  -----------------------------------------------------------------------------------------------------    EEG Classification / Summary:  normal EEG study, awake / drowsy / asleep    -----------------------------------------------------------------------------------------------------    Clinical Impression:    Normal REEG  There were no epileptiform abnormalities recorded.      This is a prelim report only, pending review with attending prior to finalization.    -------------------------------------------------------------------------------------------------------  [facility] EEG Reading Room Ph#: [phone]  Epilepsy Answering Service after 5PM and before 8:30AM: Ph#: [phone]    Naeem Valera M.D, epilepsy fellow   YAO MURRAY [id number] 89y (22-Aug-1931)F  Admitting MD: Dr. Don Hassan    Study Date: 10-28-22    --------------------------------------------------------------------------------------------------  History:  CC/ HPI Patient is a 89y old  Female who presents with a chief complaint of concern for seizure activity (27 Oct 2022 23:52)    ceFAZolin   IVPB      ceFAZolin   IVPB 1000 milliGRAM(s) IV Intermittent every 8 hours  heparin   Injectable 5000 Unit(s) SubCutaneous every 8 hours  influenza  Vaccine (HIGH DOSE) 0.7 milliLiter(s) IntraMuscular once    --------------------------------------------------------------------------------------------------  Study Interpretation:    [[[Abbreviation Key:  PDR=alpha rhythm/posterior dominant rhythm. A-P=anterior posterior gradient.  Amplitude: ‘very low’:<20; ‘low’:20-50; ‘medium’:; ‘high’:>200uV.  Persistence for periodic/rhythmic patterns (% of epoch) ‘rare’:<1%; ‘occasional’:1-10%; ‘frequent’:10-50%; ‘abundant’:50-90%; ‘continuous’:>90%.  Persistence for sporadic discharges: ‘rare’:<1/hr; ‘occasional’:1/min-1/hr; ‘frequent’:>1/min; ‘abundant’:>1/10 sec.  GRDA=generalized rhythmic delta activity; FIRDA=frontal intermittent GRDA; LRDA=lateralized rhythmic delta activity; TIRDA=temporal intermittent rhythmic delta activity;  LPD=PLED=lateralized periodic discharges; GPD=generalized periodic discharges; BiPDs=BiPLEDs=bilateral independent periodic epileptiform discharges; SIRPID=stimulus induced rhythmic, periodic, or ictal appearing discharges; BIRDs=brief potentially ictal rhythmic discharges >4 Hz, lasting .5-10s; PFA (paroxysmal bursts >13 Hz or =8 Hz).  Modifiers: +F=with fast component; +S=with spike component; +R=with rhythmic component.  S-B=burst suppression pattern.  Max=maximal. N1-drowsy; N2-stage II sleep; N3-slow wave sleep. SSS/BETS=small sharp spikes/benign epileptiform transients of sleep. HV=hyperventilation; PS=photic stimulation]]]    FINDINGS:  The background was continuous, spontaneously variable and reactive.  During wakefulness, the posteriorly dominant rhythm consisted of symmetric, well modulated 8.5-9 Hz activity, with an amplitude to 40 uV, that attenuated to eye opening.  Low amplitude central beta was noted in wakefulness.    Background Slowing:  Generalized slowing: none was present.  Focal slowing: none was present.    Sleep Background:  -Drowsiness was characterized by fragmentation, attenuation, and slowing of the background activity.    -N2 was characterized by the presence of vertex waves, symmetric spindles, and K-complexes.    Epileptiform Activity:   No interictal epileptiform discharges were present.    Events:  No clinical events were recorded.  No seizures were recorded.    Activation Procedures:   -Hyperventilation was not performed.    -Photic stimulation was performed and did not elicit any abnormalities.      Artifacts:  Intermittent myogenic and external motion artifacts were noted.    ECG:  The heart rate on single channel ECG at baseline was predominantly near BPM = 60-70  -----------------------------------------------------------------------------------------------------    EEG Classification / Summary:  normal EEG study, awake / drowsy / asleep    -----------------------------------------------------------------------------------------------------    Clinical Impression:    Normal REEG  There were no epileptiform abnormalities recorded.         -------------------------------------------------------------------------------------------------------  [facility] EEG Reading Room Ph#: [phone]  Epilepsy Answering Service after 5PM and before 8:30AM: Ph#: [phone]    Naeem Valera M.D, epilepsy fellow

## 2022-10-29 LAB
ANION GAP SERPL CALC-SCNC: 10 MMOL/L — SIGNIFICANT CHANGE UP (ref 5–17)
BUN SERPL-MCNC: 40 MG/DL — HIGH (ref 7–23)
CALCIUM SERPL-MCNC: 10 MG/DL — SIGNIFICANT CHANGE UP (ref 8.4–10.5)
CHLORIDE SERPL-SCNC: 103 MMOL/L — SIGNIFICANT CHANGE UP (ref 96–108)
CO2 SERPL-SCNC: 27 MMOL/L — SIGNIFICANT CHANGE UP (ref 22–31)
CREAT SERPL-MCNC: 1.3 MG/DL — SIGNIFICANT CHANGE UP (ref 0.5–1.3)
EGFR: 39 ML/MIN/1.73M2 — LOW
GLUCOSE SERPL-MCNC: 104 MG/DL — HIGH (ref 70–99)
HCT VFR BLD CALC: 30 % — LOW (ref 34.5–45)
HGB BLD-MCNC: 9.5 G/DL — LOW (ref 11.5–15.5)
MCHC RBC-ENTMCNC: 31.7 GM/DL — LOW (ref 32–36)
MCHC RBC-ENTMCNC: 33.8 PG — SIGNIFICANT CHANGE UP (ref 27–34)
MCV RBC AUTO: 106.8 FL — HIGH (ref 80–100)
NRBC # BLD: 0 /100 WBCS — SIGNIFICANT CHANGE UP (ref 0–0)
PLATELET # BLD AUTO: 325 K/UL — SIGNIFICANT CHANGE UP (ref 150–400)
POTASSIUM SERPL-MCNC: 4.6 MMOL/L — SIGNIFICANT CHANGE UP (ref 3.5–5.3)
POTASSIUM SERPL-SCNC: 4.6 MMOL/L — SIGNIFICANT CHANGE UP (ref 3.5–5.3)
RBC # BLD: 2.81 M/UL — LOW (ref 3.8–5.2)
RBC # FLD: 13.5 % — SIGNIFICANT CHANGE UP (ref 10.3–14.5)
SODIUM SERPL-SCNC: 140 MMOL/L — SIGNIFICANT CHANGE UP (ref 135–145)
WBC # BLD: 3.21 K/UL — LOW (ref 3.8–10.5)
WBC # FLD AUTO: 3.21 K/UL — LOW (ref 3.8–10.5)

## 2022-10-29 PROCEDURE — 99223 1ST HOSP IP/OBS HIGH 75: CPT | Mod: GC

## 2022-10-29 PROCEDURE — 73562 X-RAY EXAM OF KNEE 3: CPT | Mod: 26,LT

## 2022-10-29 RX ORDER — CEFTRIAXONE 500 MG/1
2000 INJECTION, POWDER, FOR SOLUTION INTRAMUSCULAR; INTRAVENOUS EVERY 24 HOURS
Refills: 0 | Status: DISCONTINUED | OUTPATIENT
Start: 2022-10-29 | End: 2022-11-02

## 2022-10-29 RX ORDER — CHLORHEXIDINE GLUCONATE 213 G/1000ML
1 SOLUTION TOPICAL DAILY
Refills: 0 | Status: DISCONTINUED | OUTPATIENT
Start: 2022-10-29 | End: 2022-11-02

## 2022-10-29 RX ADMIN — Medication 650 MILLIGRAM(S): at 14:44

## 2022-10-29 RX ADMIN — Medication 50 MILLIGRAM(S): at 06:22

## 2022-10-29 RX ADMIN — Medication 81 MILLIGRAM(S): at 11:46

## 2022-10-29 RX ADMIN — Medication 100 MILLIGRAM(S): at 06:25

## 2022-10-29 RX ADMIN — HEPARIN SODIUM 5000 UNIT(S): 5000 INJECTION INTRAVENOUS; SUBCUTANEOUS at 14:34

## 2022-10-29 RX ADMIN — Medication 100 MILLIGRAM(S): at 14:35

## 2022-10-29 RX ADMIN — HEPARIN SODIUM 5000 UNIT(S): 5000 INJECTION INTRAVENOUS; SUBCUTANEOUS at 06:23

## 2022-10-29 RX ADMIN — HEPARIN SODIUM 5000 UNIT(S): 5000 INJECTION INTRAVENOUS; SUBCUTANEOUS at 21:37

## 2022-10-29 RX ADMIN — CHLORHEXIDINE GLUCONATE 1 APPLICATION(S): 213 SOLUTION TOPICAL at 14:46

## 2022-10-29 RX ADMIN — CEFTRIAXONE 100 MILLIGRAM(S): 500 INJECTION, POWDER, FOR SOLUTION INTRAMUSCULAR; INTRAVENOUS at 21:37

## 2022-10-29 RX ADMIN — Medication 650 MILLIGRAM(S): at 15:44

## 2022-10-29 NOTE — PHYSICAL THERAPY INITIAL EVALUATION ADULT - IMPAIRMENTS CONTRIBUTING TO GAIT DEVIATIONS, PT EVAL
decreased endurance intermittent min unsteady vc for sequencing/impaired balance/decreased flexibility/impaired postural control/decreased strength

## 2022-10-29 NOTE — PHYSICAL THERAPY INITIAL EVALUATION ADULT - LEVEL OF INDEPENDENCE: GAIT, REHAB EVAL
Refused Refused; 11/1/22 pt agreeable/minimum assist (75% patients effort)/moderate assist (50% patients effort)

## 2022-10-29 NOTE — PHYSICAL THERAPY INITIAL EVALUATION ADULT - LEVEL OF INDEPENDENCE: STAND/SIT, REHAB EVAL
contact guard/minimum assist (75% patients effort) 11/1/22 min of 1 from rolling walker/contact guard/minimum assist (75% patients effort)

## 2022-10-29 NOTE — PHYSICAL THERAPY INITIAL EVALUATION ADULT - GENERAL OBSERVATIONS, REHAB EVAL
Pt rec'd in bed semi supine, A/O2-3, +IVL, Pt rec'd in bed semi supine, A/O2-3, +IVL,; 11/1/22 pt received in bed all siderails up pt prefernece uses to move in bed , HOB 35 degrees call bell,phone and table in reach bed in lwoest postiion and bed alarm active pt agreebale for fxl eval to be completed , dressing L knee c,d I

## 2022-10-29 NOTE — CONSULT NOTE ADULT - ATTENDING COMMENTS
89F with paroxysmal AFib, HFrEF, sinus arrest s/p PPM, PAD, aortic stenosis, and recent hospitalization at [facility] for apparent infection of or around prosthetic knee joint (s/p poly exchange, reportedly grew GBS, planned for 6 weeks ceftriaxone starting 10/11/22), who presents from [facility]hab facility for concern of seizure activity. ID consulted for antibiotic management.    Impression:  #Recent L Knee PJI   Reportedly grew GBS on cultures  Was discharged on ceftriaxone with plan to complete 6 week course  No evidence of acute infection at this time    Recs:  - resume ceftriaxone to complete 6 week course for PJI  begn 10/11 - continue through 11/22/22  - check L Knee XR  - check ESR, CRP  - PT eval  - monitor temp, WBCs

## 2022-10-29 NOTE — PHYSICAL THERAPY INITIAL EVALUATION ADULT - LEVEL OF INDEPENDENCE: SIT/STAND, REHAB EVAL
contact guard/minimum assist (75% patients effort) 11/1/22 mod of1 and min of 1 at rolling walker/contact guard/minimum assist (75% patients effort)

## 2022-10-29 NOTE — CONSULT NOTE ADULT - SUBJECTIVE AND OBJECTIVE BOX
Patient is a 89y old  Female who presents with a chief complaint of concern for seizure activity (28 Oct 2022 15:05)    HPI:  89 year old female with hx of paroxysmal AFib, HFrEF, sinus arrest s/p pacemaker, PAD, aortic valve stenosis, and recent hospitalization for infected prosthetic knee joint who presents from [facility] rehab facility for concern of seizure activity. According to provider available at [facility] for rehab, the patient was sent to the hospital after an episode of limited responsiveness associated with jerking movements of the limbs. This episode lasted for about five minutes. The patient herself did not recall the events that lead to her admissions. She states she was at the rehab facility, waiting to be transported to an outpatient appointment when she was brought to the hospital instead. She repeated states that 'it wasn't fair' that she was brought here instead without her personal belongings. She denies losing consciousness at any point, no confusion, no headaches. Per outpatient records (see [facility]), the patient was hospitalized earlier this month after a syncopal episode, and was found to have infected prosthetic knee joint requiring left knee I&D with poly exchange.    REVIEW OF SYSTEMS  [  ] ROS unobtainable because:    [ x ] All other systems negative except as noted below    Constitutional:  [ ] fever [ ] chills  [ ] weight loss  [ ]night sweat  [ ]poor appetite/PO intake [ ]fatigue   Skin:  [ ] rash [ ] phlebitis	  Eyes: [ ] icterus [ ] pain  [ ] discharge	  ENMT: [ ] sore throat  [ ] thrush [ ] ulcers [ ] exudates [ ]anosmia  Respiratory: [ ] dyspnea [ ] hemoptysis [ ] cough [ ] sputum	  Cardiovascular:  [ ] chest pain [ ] palpitations [ ] edema	  Gastrointestinal:  [ ] nausea [ ] vomiting [ ] diarrhea [ ] constipation [ ] pain	  Genitourinary:  [ ] dysuria [ ] frequency [ ] hematuria [ ] discharge [ ] flank pain  [ ] incontinence  Musculoskeletal:  [ ] myalgias [ ] arthralgias [ ] arthritis  [ ] back pain  Neurological:  [ ] headache [ ] weakness [ ] seizures  [ ] confusion/altered mental status    prior hospital charts reviewed [V]  primary team notes reviewed [V]  other consultant notes reviewed [V]    PAST MEDICAL & SURGICAL HISTORY:  Type 2 diabetes mellitus  S/P knee surgery      FAMILY HISTORY:  No pertinent family history in first degree relatives      SOCIAL HISTORY:  Denied smoking    Allergies  aloe vera (Unknown)  Cherries (Unknown)  latex (Unknown)  levofloxacin (Unknown)  melon (Unknown)  minocycline (Unknown)  Silvadene (Unknown)  Sulfacetamide Sodium (Unknown)        ANTIMICROBIALS:  ceFAZolin   IVPB    ceFAZolin   IVPB 1000 every 8 hours      ANTIMICROBIALS (past 90 days):   MEDICATIONS  (STANDING):    ceFAZolin   IVPB   100 mL/Hr IV Intermittent (10-28-22 @ 05:24)    ceFAZolin   IVPB   100 mL/Hr IV Intermittent (10-29-22 @ 14:35)   100 mL/Hr IV Intermittent (10-29-22 @ 06:25)   100 mL/Hr IV Intermittent (10-28-22 @ 23:17)   100 mL/Hr IV Intermittent (10-28-22 @ 13:45)        MEDICATIONS  (STANDING):  acetaminophen     Tablet .. 650 every 6 hours PRN  aluminum hydroxide/magnesium hydroxide/simethicone Suspension 30 every 4 hours PRN  aspirin  chewable 81 daily  heparin   Injectable 5000 every 8 hours  influenza  Vaccine (HIGH DOSE) 0.7 once  melatonin 3 at bedtime PRN  metoprolol succinate ER 50 daily  ondansetron Injectable 4 every 8 hours PRN      VITALS:  Vital Signs Last 24 Hrs  T(F): 98.4 (10-29-22 @ 10:55), Max: 98.6 (10-28-22 @ 01:01)  Vital Signs Last 24 Hrs  HR: 71 (10-29-22 @ 10:55) (65 - 89)  BP: 152/72 (10-29-22 @ 10:55) (141/75 - 162/73)  RR: 18 (10-29-22 @ 10:55)  SpO2: 94% (10-29-22 @ 10:55) (94% - 94%)  Wt(kg): --    PHYSICAL EXAM:  Constitutional: non-toxic, no distress  HEAD/EYES: anicteric, no conjunctival injection  ENT:  supple, no thrush  Cardiovascular:   normal S1/S2  Respiratory:  clear BS bilaterally  GI:  soft, non-tender, +bowel sounds  :  no mitchell, no CVA tenderness  Musculoskeletal: L knee surgical incision site, +L knee swelling  Neurologic: awake and alert,  no focal findings  Skin:  no rash, no erythema, no phlebitis  Heme/Onc: no lymphadenopathy   Psychiatric:  awake, alert, appropriate mood      Labs:                        9.5    3.21  )-----------( 325      ( 29 Oct 2022 07:37 )             30.0     10-29    140  |  103  |  40<H>  ----------------------------<  104<H>  4.6   |  27  |  1.30    Ca    10.0      29 Oct 2022 07:36  Phos  3.4     10-28  Mg     2.2     10-28    TPro  6.9  /  Alb  3.1<L>  /  TBili  0.2  /  DBili  x   /  AST  17  /  ALT  8<L>  /  AlkPhos  83  10-27      WBC Trend:  WBC Count: 3.21 (10-29-22 @ 07:37)  WBC Count: 4.19 (10-28-22 @ 10:39)  WBC Count: 5.57 (10-27-22 @ 20:14)  WBC Count: 5.04 (10-27-22 @ 18:14)    Auto Neutrophil #: 3.39 K/uL (10-27-22 @ 18:14)    Auto Eosinophil %: 0.0 % (10-27-22 @ 18:14)      MICROBIOLOGY:  Culture - Urine (collected 27 Oct 2022 18:15)  Source: Clean Catch Clean Catch (Midstream)  Final Report:    <10,000 CFU/mL Normal Urogenital Irina    Rapid RVP Result: NotDetec (10-27 @ 18:14)      RADIOLOGY:  imaging below personally reviewed    < from: [facility] Duplex Lower Ext Vein Scan, June (10.28.22 @ 14:04) >  IMPRESSION:  No evidence of deep venous thrombosis in either lower extremity.  < end of copied text >   Patient is a 89y old  Female who presents with a chief complaint of concern for seizure activity (28 Oct 2022 15:05)    HPI:  89 year old female with hx of paroxysmal AFib, HFrEF, sinus arrest s/p pacemaker, PAD, aortic valve stenosis, and recent hospitalization for infected prosthetic knee joint who presents from [facility] rehab facility for concern of seizure activity. According to provider available at [facility] for rehab, the patient was sent to the hospital after an episode of limited responsiveness associated with jerking movements of the limbs. This episode lasted for about five minutes. The patient herself did not recall the events that lead to her admissions. She states she was at the rehab facility, waiting to be transported to an outpatient appointment when she was brought to the hospital instead. She repeated states that 'it wasn't fair' that she was brought here instead without her personal belongings. She denies losing consciousness at any point, no confusion, no headaches. Per outpatient records (see [facility]), the patient was hospitalized earlier this month after a syncopal episode, and was found to have infected prosthetic knee joint requiring left knee I&D with poly exchange. - She was prescribed 6 week course of Ceftriaxone which was initiated 10/11/22 - Notes indicate infection eith Group B Strep    Ms Kwok was able to ambulate a few steps as per daughter  The patient is concerned about the swelling and peeling around left knee    REVIEW OF SYSTEMS  [  ] ROS unobtainable because:    [ x ] All other systems negative except as noted below    Constitutional:  [ ] fever [ ] chills  [ ] weight loss  [ ]night sweat  [ ]poor appetite/PO intake [ ]fatigue   Skin:  [ ] rash [ ] phlebitis	  Eyes: [ ] icterus [ ] pain  [ ] discharge	  ENMT: [ ] sore throat  [ ] thrush [ ] ulcers [ ] exudates [ ]anosmia  Respiratory: [ ] dyspnea [ ] hemoptysis [ ] cough [ ] sputum	  Cardiovascular:  [ ] chest pain [ ] palpitations [ ] edema	  Gastrointestinal:  [ ] nausea [ ] vomiting [ ] diarrhea [ ] constipation [ ] pain	  Genitourinary:  [ ] dysuria [ ] frequency [ ] hematuria [ ] discharge [ ] flank pain  [ ] incontinence  Musculoskeletal:  [ ] myalgias [ ] arthralgias [ ] arthritis  [ ] back pain  Neurological:  [ ] headache [ ] weakness [ ] seizures  [ ] confusion/altered mental status    prior hospital charts reviewed [V]  primary team notes reviewed [V]  other consultant notes reviewed [V]    PAST MEDICAL & SURGICAL HISTORY:  Type 2 diabetes mellitus  S/P knee surgery      FAMILY HISTORY:  No pertinent family history in first degree relatives      SOCIAL HISTORY:  Denied smoking    Allergies  aloe vera (Unknown)  Cherries (Unknown)  latex (Unknown)  levofloxacin (Unknown)  melon (Unknown)  minocycline (Unknown)  Silvadene (Unknown)  Sulfacetamide Sodium (Unknown)        ANTIMICROBIALS:  ceFAZolin   IVPB    ceFAZolin   IVPB 1000 every 8 hours      ANTIMICROBIALS (past 90 days):   MEDICATIONS  (STANDING):    ceFAZolin   IVPB   100 mL/Hr IV Intermittent (10-28-22 @ 05:24)    ceFAZolin   IVPB   100 mL/Hr IV Intermittent (10-29-22 @ 14:35)   100 mL/Hr IV Intermittent (10-29-22 @ 06:25)   100 mL/Hr IV Intermittent (10-28-22 @ 23:17)   100 mL/Hr IV Intermittent (10-28-22 @ 13:45)        MEDICATIONS  (STANDING):  acetaminophen     Tablet .. 650 every 6 hours PRN  aluminum hydroxide/magnesium hydroxide/simethicone Suspension 30 every 4 hours PRN  aspirin  chewable 81 daily  heparin   Injectable 5000 every 8 hours  influenza  Vaccine (HIGH DOSE) 0.7 once  melatonin 3 at bedtime PRN  metoprolol succinate ER 50 daily  ondansetron Injectable 4 every 8 hours PRN      VITALS:  Vital Signs Last 24 Hrs  T(F): 98.4 (10-29-22 @ 10:55), Max: 98.6 (10-28-22 @ 01:01)  Vital Signs Last 24 Hrs  HR: 71 (10-29-22 @ 10:55) (65 - 89)  BP: 152/72 (10-29-22 @ 10:55) (141/75 - 162/73)  RR: 18 (10-29-22 @ 10:55)  SpO2: 94% (10-29-22 @ 10:55) (94% - 94%)  Wt(kg): --    PHYSICAL EXAM:  Constitutional: non-toxic, no distress  HEAD/EYES: anicteric, no conjunctival injection  ENT:  supple, no thrush  Cardiovascular:   normal S1/S2  Respiratory:  clear BS bilaterally  GI:  soft, non-tender, +bowel sounds  :  no mitcehll, no CVA tenderness  Musculoskeletal: L knee surgical incision site, +L knee swelling  Neurologic: awake and alert,  no focal findings  Skin:  no rash, no erythema, no phlebitis  Heme/Onc: no lymphadenopathy   Psychiatric:  awake, alert, appropriate mood      Labs:                        9.5    3.21  )-----------( 325      ( 29 Oct 2022 07:37 )             30.0     10-29    140  |  103  |  40<H>  ----------------------------<  104<H>  4.6   |  27  |  1.30    Ca    10.0      29 Oct 2022 07:36  Phos  3.4     10-28  Mg     2.2     10-28    TPro  6.9  /  Alb  3.1<L>  /  TBili  0.2  /  DBili  x   /  AST  17  /  ALT  8<L>  /  AlkPhos  83  10-27      WBC Trend:  WBC Count: 3.21 (10-29-22 @ 07:37)  WBC Count: 4.19 (10-28-22 @ 10:39)  WBC Count: 5.57 (10-27-22 @ 20:14)  WBC Count: 5.04 (10-27-22 @ 18:14)    Auto Neutrophil #: 3.39 K/uL (10-27-22 @ 18:14)    Auto Eosinophil %: 0.0 % (10-27-22 @ 18:14)      MICROBIOLOGY:  Culture - Urine (collected 27 Oct 2022 18:15)  Source: Clean Catch Clean Catch (Midstream)  Final Report:    <10,000 CFU/mL Normal Urogenital Irina    Rapid RVP Result: NotDetec (10-27 @ 18:14)      RADIOLOGY:  imaging below personally reviewed    < from: [facility] Duplex Lower Ext Vein Scan, [place] (10.28.22 @ 14:04) >  IMPRESSION:  No evidence of deep venous thrombosis in either lower extremity.  < end of copied text >

## 2022-10-29 NOTE — PHYSICAL THERAPY INITIAL EVALUATION ADULT - TRANSFER SAFETY CONCERNS NOTED: SIT/STAND, REHAB EVAL
decreased balance during turns/decreased safety awareness/decreased sequencing ability decreased balance during turns/decreased safety awareness/decreased sequencing ability/decreased step length/decreased weight-shifting ability

## 2022-10-29 NOTE — PHYSICAL THERAPY INITIAL EVALUATION ADULT - BALANCE DISTURBANCE, IDENTIFIED IMPAIRMENT CONTRIBUTE, REHAB EVAL
impaired coordination/decreased strength impaired coordination/impaired postural control/decreased strength

## 2022-10-29 NOTE — PHYSICAL THERAPY INITIAL EVALUATION ADULT - BED MOBILITY LIMITATIONS, REHAB EVAL
decreased ability to use arms for pushing/pulling/decreased ability to use legs for bridging/pushing 11/1/22 sat EOB x 6 min work on therex kf/ke x 10 L/R , df/pf x 10 arom R knee 0-100 degrees , L 0-80 degrees cg of 1/decreased ability to use arms for pushing/pulling/decreased ability to use legs for bridging/pushing

## 2022-10-29 NOTE — PHYSICAL THERAPY INITIAL EVALUATION ADULT - ADDITIONAL COMMENTS
Pt admitted from [facility]. Prior her first hospitalization she lived in a Sr. living appt alone. Stated she was I with mobility and ADls using RW.

## 2022-10-29 NOTE — CONSULT NOTE ADULT - ASSESSMENT
89 year old F PMHx paroxysmal AFib, HFrEF, sinus arrest s/p PPM, PAD, aortic stenosis, and recent hospitalization at [facility] for infected prosthetic knee joint (s/p poly exchange, reportedly grew GBS, planned for 6 weeks ceftriaxone starting 10/11/22), who presents from [facility] rehab facility for concern of seizure activity. ID consulted for antibiotic management.    Impression:  #Recent L Knee PJI   Reportedly grew GBS on cultures  Was discharged on ceftriaxone with plan to complete 6 week course  No evidence of acute infection at this time    Recs:  - resume ceftriaxone to complete 6 week course for PJI  - check L Knee XR  - check ESR, CRP  - PT eval  - monitor temp, WBCs    Plan discussed with primary team    Aiden Villanueva MD  Infectious Disease Fellow  Available on [employer] Teams  Before 9AM or after 5PM: [phone]

## 2022-10-29 NOTE — PHYSICAL THERAPY INITIAL EVALUATION ADULT - IMPAIRED TRANSFERS: SIT/STAND, REHAB EVAL
impaired balance/cognition/impaired coordination/decreased strength decreased endurance/impaired balance/cognition/impaired coordination/impaired postural control/decreased strength

## 2022-10-29 NOTE — PHYSICAL THERAPY INITIAL EVALUATION ADULT - NSPTDISCHREC_GEN_A_CORE
TBD. Pending Gait assessment. Anticipating to return to [facility]. TBD. Pending Gait assessment. Anticipating to return to [facility].; plan is for pt to return to [facility] rehab pt agreeable ; if pt goes home need assist all fxl activity and adl's and Home PT ; pt ahs a rolling walker would need w/c , 3:1 commode , shower seat w/ tub bench/Sub-acute Rehab

## 2022-10-29 NOTE — PHYSICAL THERAPY INITIAL EVALUATION ADULT - IMPAIRMENTS FOUND, PT EVAL
aerobic capacity/endurance/gait, locomotion, and balance/muscle strength aerobic capacity/endurance/gait, locomotion, and balance/muscle strength/ROM

## 2022-10-29 NOTE — PHYSICAL THERAPY INITIAL EVALUATION ADULT - PERTINENT HX OF CURRENT PROBLEM, REHAB EVAL
89 year old female with hx of paroxysmal AFib with a pacemaker CAD , CHF, PAD, aortic valve stenosis, and recent knee surgery on 10/8 comes into the ED with her daughter from her facility where she was found to have "seizure-like activity". CT  Head-  There is no intraparenchymal hematoma, mass effect or midline shift. The basal cisterns are patent. No abnormal extra-axial fluid collections are present. CXR: Trace bilateral pleural effusions. 89 year old female with hx of paroxysmal AFib with a pacemaker CAD , CHF, PAD, aortic valve stenosis, and recent knee surgery on 10/8 comes into the ED from [facility] facility for concern of seizure activity. CT  Head-  There is no intraparenchymal hematoma, mass effect or midline shift. The basal cisterns are patent. No abnormal extra-axial fluid collections are present. CXR: Trace bilateral pleural effusions.

## 2022-10-30 LAB
ANION GAP SERPL CALC-SCNC: 9 MMOL/L — SIGNIFICANT CHANGE UP (ref 5–17)
BUN SERPL-MCNC: 35 MG/DL — HIGH (ref 7–23)
CALCIUM SERPL-MCNC: 9.8 MG/DL — SIGNIFICANT CHANGE UP (ref 8.4–10.5)
CHLORIDE SERPL-SCNC: 103 MMOL/L — SIGNIFICANT CHANGE UP (ref 96–108)
CO2 SERPL-SCNC: 26 MMOL/L — SIGNIFICANT CHANGE UP (ref 22–31)
CREAT SERPL-MCNC: 1.36 MG/DL — HIGH (ref 0.5–1.3)
CRP SERPL-MCNC: 65 MG/L — HIGH (ref 0–4)
EGFR: 37 ML/MIN/1.73M2 — LOW
ERYTHROCYTE [SEDIMENTATION RATE] IN BLOOD: 70 MM/HR — HIGH (ref 0–20)
GLUCOSE SERPL-MCNC: 114 MG/DL — HIGH (ref 70–99)
HCT VFR BLD CALC: 27.4 % — LOW (ref 34.5–45)
HGB BLD-MCNC: 8.9 G/DL — LOW (ref 11.5–15.5)
MCHC RBC-ENTMCNC: 32.5 GM/DL — SIGNIFICANT CHANGE UP (ref 32–36)
MCHC RBC-ENTMCNC: 34.2 PG — HIGH (ref 27–34)
MCV RBC AUTO: 105.4 FL — HIGH (ref 80–100)
MRSA PCR RESULT.: SIGNIFICANT CHANGE UP
NRBC # BLD: 0 /100 WBCS — SIGNIFICANT CHANGE UP (ref 0–0)
PLATELET # BLD AUTO: 307 K/UL — SIGNIFICANT CHANGE UP (ref 150–400)
POTASSIUM SERPL-MCNC: 4.5 MMOL/L — SIGNIFICANT CHANGE UP (ref 3.5–5.3)
POTASSIUM SERPL-SCNC: 4.5 MMOL/L — SIGNIFICANT CHANGE UP (ref 3.5–5.3)
RBC # BLD: 2.6 M/UL — LOW (ref 3.8–5.2)
RBC # FLD: 13.8 % — SIGNIFICANT CHANGE UP (ref 10.3–14.5)
S AUREUS DNA NOSE QL NAA+PROBE: SIGNIFICANT CHANGE UP
SODIUM SERPL-SCNC: 138 MMOL/L — SIGNIFICANT CHANGE UP (ref 135–145)
WBC # BLD: 3.25 K/UL — LOW (ref 3.8–10.5)
WBC # FLD AUTO: 3.25 K/UL — LOW (ref 3.8–10.5)

## 2022-10-30 PROCEDURE — 99232 SBSQ HOSP IP/OBS MODERATE 35: CPT

## 2022-10-30 RX ADMIN — CEFTRIAXONE 100 MILLIGRAM(S): 500 INJECTION, POWDER, FOR SOLUTION INTRAMUSCULAR; INTRAVENOUS at 21:08

## 2022-10-30 RX ADMIN — HEPARIN SODIUM 5000 UNIT(S): 5000 INJECTION INTRAVENOUS; SUBCUTANEOUS at 14:23

## 2022-10-30 RX ADMIN — CHLORHEXIDINE GLUCONATE 1 APPLICATION(S): 213 SOLUTION TOPICAL at 12:17

## 2022-10-30 RX ADMIN — Medication 50 MILLIGRAM(S): at 06:03

## 2022-10-30 RX ADMIN — Medication 650 MILLIGRAM(S): at 23:53

## 2022-10-30 RX ADMIN — HEPARIN SODIUM 5000 UNIT(S): 5000 INJECTION INTRAVENOUS; SUBCUTANEOUS at 06:03

## 2022-10-30 RX ADMIN — Medication 81 MILLIGRAM(S): at 12:16

## 2022-10-30 RX ADMIN — HEPARIN SODIUM 5000 UNIT(S): 5000 INJECTION INTRAVENOUS; SUBCUTANEOUS at 21:08

## 2022-10-30 NOTE — CONSULT NOTE ADULT - ASSESSMENT
89 year old female with  paroxysmal AFib, HFrEF, sinus arrest s/p pacemaker, PAD, aortic valve stenosis, and recent hospitalization for infected prosthetic knee joint who presents from [facility] rehab facility for concern of seizure activity. According to provider available at [facility] for rehab, the patient was sent to the hospital after an episode of limited responsiveness associated with jerking movements of the limbs. This episode lasted for about five minutes.    no LOC, no tongue bite    CTH chronic R thalamic infarct (read as age indeterminate   EEG neg   o/e AAOx2-3, BELLE   NIHSS=8 premrs 3    Impression:   1) seizure like event    2) chronic R thalamic stroke. small vessel in etiology   3) AMS likely toxic metabolic on top of dementia     - for AF should be on DOAC if no contraindication. now on asa 81mg in mean time.     - should also be on statin therapy for old/chronic thalamic infarct, LDL goal < 70   - CTX for infection/ knee effusion  - no need for MRI, unlikely to    - would hold off AED unless has another event.    - Hemoglobin A1c and lipid panel  - outpatient vessel imaging CD/TCD for completion of stroke workup.   - PT/OT/SS/SLP, OOBC  - check FS, glucose control <180  - GI/DVT ppx  - Counseling on diet, exercise, and medication adherence was done  - Counseling on smoking cessation and alcohol consumption offered when appropriate.  - Pain assessed and judicious use of narcotics when appropriate was discussed.    - Stroke education given when appropriate.  - Importance of fall prevention discussed.   - Differential diagnosis and plan of care discussed with patient and/or family and primary team  - Thank you for allowing me to participate in the care of this patient. Call with questions.   Stephen Max MD  Vascular Neurology  Office: [phone]

## 2022-10-30 NOTE — CONSULT NOTE ADULT - SUBJECTIVE AND OBJECTIVE BOX
Neurology Consult    Reason for Consult: Patient is a 89y old  Female who presents with a chief complaint of concern for seizure activity (29 Oct 2022 18:15)      HPI:  89 year old female with hx of paroxysmal AFib, HFrEF, sinus arrest s/p pacemaker, PAD, aortic valve stenosis, and recent hospitalization for infected prosthetic knee joint who presents from [facility] rehab facility for concern of seizure activity. According to provider available at [facility] for rehab, the patient was sent to the hospital after an episode of limited responsiveness associated with jerking movements of the limbs. This episode lasted for about five minutes.     The patient herself did not recall the events that lead to her admissions. She states she was at the rehab facility, waiting to be transported to an outpatient appointment when she was brought to the hospital instead. She repeated states that 'it wasn't fair' that she was brought here instead without her personal belongings. She denies losing consciousness at any point, no confusion, no headaches.     The patient was AAOx2 at the time of evaluation. She could not recall the name of her rehab facility and didn't recall the name of the hospital where she recently had knee I&D. Per [facility] rehab facility, the patient is typically AAOx3. I attempted to call the patient's daughter as well for collateral; however, phone went straight to voicemail.    Per outpatient records (see [facility]), the patient was hospitalized earlier this month after a syncopal episode, and was found to have infected prosthetic knee joint requiring left knee I&D with poly exchange. She was treated with a course of vanc and zosyn, eventually discharged to [facility]. (27 Oct 2022 23:52)       PAST MEDICAL & SURGICAL HISTORY:  Type 2 diabetes mellitus      S/P knee surgery          Allergies: Allergies    aloe vera (Unknown)  Cherries (Unknown)  latex (Unknown)  levofloxacin (Unknown)  melon (Unknown)  minocycline (Unknown)  Silvadene (Unknown)  Sulfacetamide Sodium (Unknown)    Intolerances        Social History: Denies toxic habits including tobacco, ETOH or illicit drugs.    Family History: FAMILY HISTORY:  No pertinent family history in first degree relatives    . No family history of strokes    Medications: MEDICATIONS  (STANDING):  aspirin  chewable 81 milliGRAM(s) Oral daily  cefTRIAXone   IVPB 2000 milliGRAM(s) IV Intermittent every 24 hours  chlorhexidine 2% Cloths 1 Application(s) Topical daily  heparin   Injectable 5000 Unit(s) SubCutaneous every 8 hours  influenza  Vaccine (HIGH DOSE) 0.7 milliLiter(s) IntraMuscular once  metoprolol succinate ER 50 milliGRAM(s) Oral daily    MEDICATIONS  (PRN):  acetaminophen     Tablet .. 650 milliGRAM(s) Oral every 6 hours PRN Temp greater or equal to 38C (100.4F), Mild Pain (1 - 3)  aluminum hydroxide/magnesium hydroxide/simethicone Suspension 30 milliLiter(s) Oral every 4 hours PRN Dyspepsia  melatonin 3 milliGRAM(s) Oral at bedtime PRN Insomnia  ondansetron Injectable 4 milliGRAM(s) IV Push every 8 hours PRN Nausea and/or Vomiting      Review of Systems:  CONSTITUTIONAL:  No weight loss, fever, chills, weakness or fatigue.  HEENT:  Eyes:  No visual loss, blurred vision, double vision or yellow sclera. Ears, Nose, Throat:  No hearing loss, sneezing, congestion, runny nose or sore throat.  SKIN:  No rash or itching.  CARDIOVASCULAR:  No chest pain, chest pressure or chest discomfort. No palpitations or edema.  RESPIRATORY:  No shortness of breath, cough or sputum.  GASTROINTESTINAL:  No anorexia, nausea, vomiting or diarrhea. No abdominal pain or blood.  GENITOURINARY:  No burning on urination or incontinence   NEUROLOGICAL:  No headache, dizziness, syncope, paralysis, ataxia, numbness or tingling in the extremities. No change in bowel or bladder control. no limb weakness. no vision changes.   MUSCULOSKELETAL:  No muscle, back pain, joint pain or stiffness.  HEMATOLOGIC:  No anemia, bleeding or bruising.  LYMPHATICS:  No enlarged nodes. No history of splenectomy.  PSYCHIATRIC:  No history of depression or anxiety.  ENDOCRINOLOGIC:  No reports of sweating, cold or heat intolerance. No polyuria or polydipsia.      Vitals:  Vital Signs Last 24 Hrs  T(C): 36.9 (30 Oct 2022 04:50), Max: 36.9 (29 Oct 2022 10:55)  T(F): 98.4 (30 Oct 2022 04:50), Max: 98.4 (29 Oct 2022 10:55)  HR: 62 (30 Oct 2022 04:50) (62 - 89)  BP: 132/75 (30 Oct 2022 04:50) (100/56 - 152/72)  BP(mean): --  RR: 18 (30 Oct 2022 04:50) (18 - 18)  SpO2: 96% (30 Oct 2022 04:50) (94% - 100%)    Parameters below as of 30 Oct 2022 04:50  Patient On (Oxygen Delivery Method): room air        General Exam:   General Appearance: Appropriately dressed and in no acute distress       Head: Normocephalic, atraumatic and no dysmorphic features  Ear, Nose, and Throat: Moist mucous membranes  CVS: S1S2+  Resp: No SOB, no wheeze or rhonchi  GI: soft NT/ND  Extremities: No edema or cyanosis  Skin: No bruises or rashes     Neurological Exam:  Mental Status: Awake, alert and oriented x 2-3.  Able to follow simple and complex verbal commands. Able to name and repeat. fluent speech. No obvious aphasia or dysarthria noted.   Cranial Nerves: PERRL, EOMI, VFFC, sensation V1-V3 intact,  no obvious facial asymmetry, equal elevation of palate, scm/trap 5/5, tongue is midline on protrusion. no obvious papilledema on fundoscopic exam. hearing is grossly intact.   Motor: BELLE at least 3-4/5   Sensation: Intact to light touch and pinprick throughout. no right/left confusion. no extinction to tactile on DSS.   Reflexes: 1+ throughout at biceps, brachioradialis, triceps, patellars and ankles bilaterally and equal. No clonus. R toe and L toe were both downgoing.  Coordination: No dysmetria on FNF    Gait:deferred     Data/Labs/Imaging which I personally reviewed.     Labs:     CBC Full  -  ( 30 Oct 2022 07:31 )  WBC Count : 3.25 K/uL  RBC Count : 2.60 M/uL  Hemoglobin : 8.9 g/dL  Hematocrit : 27.4 %  Platelet Count - Automated : 307 K/uL  Mean Cell Volume : 105.4 fl  Mean Cell Hemoglobin : 34.2 pg  Mean Cell Hemoglobin Concentration : 32.5 gm/dL  Auto Neutrophil # : x  Auto Lymphocyte # : x  Auto Monocyte # : x  Auto Eosinophil # : x  Auto Basophil # : x  Auto Neutrophil % : x  Auto Lymphocyte % : x  Auto Monocyte % : x  Auto Eosinophil % : x  Auto Basophil % : x    10-30    138  |  103  |  35<H>  ----------------------------<  114<H>  4.5   |  26  |  1.36<H>    Ca    9.8      30 Oct 2022 07:26  Phos  3.4     10-28  Mg     2.2     10-28        < from: CT Head No Cont (10.27.22 @ 18:49) >    ACC: [id number] EXAM:  CT BRAIN                          PROCEDURE DATE:  10/27/2022          INTERPRETATION:  CLINICAL INDICATION: Seizure-like activity. Altered   mental status.    TECHNIQUE: Noncontrast CT of the head was performed.    Multiple contiguous axial images were acquired from the skull base to the   vertex without the administration of intravenous contrast. Coronal and   sagittal reformations were made.    COMPARISON: None.    FINDINGS:  Mild prominence of the sulci and ventricles are consistent with   age-appropriate volume loss. There are hemispheric white matter areas of   low attenuation which are nonspecific but likely related to sequelae of   microvascular disease. 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    Age-indeterminate lacunar infarct right thalamus.    There is no intraparenchymal hematoma, mass effect or midline shift. The   basal cisterns are patent.  No abnormal extra-axial fluid collections are   present.    The visualized paranasal sinuses are clear. The mastoid air cells and   middle ear cavities are clear.    Cranium is intact.        IMPRESSION:    No acute intracranial hemorrhage.    Age-indeterminate lacunar infarct right thalamus. Correlate clinically.   MRI exam recommended as clinically warranted.    --- End of Report ---        KECIA KENT MD; Resident Radiology  This document has been electronically signed.  WERNER ARELLANO MD; Attending Radiologist  This document has been electronically signed. Oct 27 2022  7:23PM    < end of copied text >          Normal REEG  There were no epileptiform abnormalities recorded.

## 2022-10-31 LAB
ANION GAP SERPL CALC-SCNC: 10 MMOL/L — SIGNIFICANT CHANGE UP (ref 5–17)
BUN SERPL-MCNC: 30 MG/DL — HIGH (ref 7–23)
CALCIUM SERPL-MCNC: 9.7 MG/DL — SIGNIFICANT CHANGE UP (ref 8.4–10.5)
CHLORIDE SERPL-SCNC: 104 MMOL/L — SIGNIFICANT CHANGE UP (ref 96–108)
CO2 SERPL-SCNC: 25 MMOL/L — SIGNIFICANT CHANGE UP (ref 22–31)
CREAT SERPL-MCNC: 1.39 MG/DL — HIGH (ref 0.5–1.3)
EGFR: 36 ML/MIN/1.73M2 — LOW
GLUCOSE SERPL-MCNC: 103 MG/DL — HIGH (ref 70–99)
HCT VFR BLD CALC: 29.4 % — LOW (ref 34.5–45)
HGB BLD-MCNC: 9.4 G/DL — LOW (ref 11.5–15.5)
MCHC RBC-ENTMCNC: 32 GM/DL — SIGNIFICANT CHANGE UP (ref 32–36)
MCHC RBC-ENTMCNC: 33.9 PG — SIGNIFICANT CHANGE UP (ref 27–34)
MCV RBC AUTO: 106.1 FL — HIGH (ref 80–100)
NRBC # BLD: 0 /100 WBCS — SIGNIFICANT CHANGE UP (ref 0–0)
PLATELET # BLD AUTO: 290 K/UL — SIGNIFICANT CHANGE UP (ref 150–400)
POTASSIUM SERPL-MCNC: 4.4 MMOL/L — SIGNIFICANT CHANGE UP (ref 3.5–5.3)
POTASSIUM SERPL-SCNC: 4.4 MMOL/L — SIGNIFICANT CHANGE UP (ref 3.5–5.3)
RBC # BLD: 2.77 M/UL — LOW (ref 3.8–5.2)
RBC # FLD: 13.6 % — SIGNIFICANT CHANGE UP (ref 10.3–14.5)
SODIUM SERPL-SCNC: 139 MMOL/L — SIGNIFICANT CHANGE UP (ref 135–145)
WBC # BLD: 3.32 K/UL — LOW (ref 3.8–10.5)
WBC # FLD AUTO: 3.32 K/UL — LOW (ref 3.8–10.5)

## 2022-10-31 PROCEDURE — 99222 1ST HOSP IP/OBS MODERATE 55: CPT

## 2022-10-31 RX ORDER — POLYETHYLENE GLYCOL 3350 17 G/17G
17 POWDER, FOR SOLUTION ORAL DAILY
Refills: 0 | Status: DISCONTINUED | OUTPATIENT
Start: 2022-10-31 | End: 2022-11-02

## 2022-10-31 RX ADMIN — HEPARIN SODIUM 5000 UNIT(S): 5000 INJECTION INTRAVENOUS; SUBCUTANEOUS at 15:32

## 2022-10-31 RX ADMIN — CEFTRIAXONE 100 MILLIGRAM(S): 500 INJECTION, POWDER, FOR SOLUTION INTRAMUSCULAR; INTRAVENOUS at 21:39

## 2022-10-31 RX ADMIN — Medication 650 MILLIGRAM(S): at 16:05

## 2022-10-31 RX ADMIN — Medication 50 MILLIGRAM(S): at 05:17

## 2022-10-31 RX ADMIN — Medication 30 MILLILITER(S): at 21:38

## 2022-10-31 RX ADMIN — CHLORHEXIDINE GLUCONATE 1 APPLICATION(S): 213 SOLUTION TOPICAL at 15:29

## 2022-10-31 RX ADMIN — POLYETHYLENE GLYCOL 3350 17 GRAM(S): 17 POWDER, FOR SOLUTION ORAL at 15:32

## 2022-10-31 RX ADMIN — HEPARIN SODIUM 5000 UNIT(S): 5000 INJECTION INTRAVENOUS; SUBCUTANEOUS at 21:38

## 2022-10-31 RX ADMIN — Medication 650 MILLIGRAM(S): at 15:32

## 2022-10-31 RX ADMIN — Medication 81 MILLIGRAM(S): at 12:32

## 2022-10-31 RX ADMIN — HEPARIN SODIUM 5000 UNIT(S): 5000 INJECTION INTRAVENOUS; SUBCUTANEOUS at 05:16

## 2022-10-31 NOTE — CONSULT NOTE ADULT - SUBJECTIVE AND OBJECTIVE BOX
Patient is a 89y old  Female who presents with a chief complaint of concern for seizure activity (31 Oct 2022 08:39)    Ad,mission HPI:  89 year old female with hx of paroxysmal AFib, HFrEF, sinus arrest s/p pacemaker, PAD, aortic valve stenosis, and recent hospitalization for infected prosthetic knee joint who presents from [facility] rehab facility for concern of seizure activity. According to provider available at [facility] for rehab, the patient was sent to the hospital after an episode of limited responsiveness associated with jerking movements of the limbs. This episode lasted for about five minutes.     The patient herself did not recall the events that lead to her admissions. She states she was at the rehab facility, waiting to be transported to an outpatient appointment when she was brought to the hospital instead. She repeated states that 'it wasn't fair' that she was brought here instead without her personal belongings. She denies losing consciousness at any point, no confusion, no headaches.     The patient was AAOx2 at the time of evaluation. She could not recall the name of her rehab facility and didn't recall the name of the hospital where she recently had knee I&D. Per [facility] rehab facility, the patient is typically AAOx3. I attempted to call the patient's daughter as well for collateral; however, phone went straight to voicemail.    Per outpatient records (see [facility]), the patient was hospitalized earlier this month after a syncopal episode, and was found to have infected prosthetic knee joint requiring left knee I&D with poly exchange. She was treated with a course of vanc and zosyn, eventually discharged to [facility]. (27 Oct 2022 23:52)    Interval History:  Patient admitted to [facility]    CT Head No Cont (10.27.22 @ 18:49) >  No acute intracranial hemorrhage.  Age-indeterminate lacunar infarct right thalamus. Correlate clinically.   MRI exam recommended as clinically warranted.    EEG  `Clinical Impression:  Normal REEG  There were no epileptiform abnormalities recorded.      Diagnosed w toxic-metabolic encephalopathy.  Patient w persistent functional deficits.    REVIEW OF SYSTEMS: No chest pain, shortness of breath, nausea, vomiting or diarhea; other ROS neg     PAST MEDICAL & SURGICAL HISTORY  Type 2 diabetes mellitus  S/P knee surgery    FUNCTIONAL HISTORY:   Lives alone in senior living.  Gets assist w ADLs.  Was at [facility] prior to admission.     CURRENT FUNCTIONAL STATUS:  CG Transfers    FAMILY HISTORY   No pertinent family history in first degree relatives    MEDICATIONS   acetaminophen     Tablet .. 650 milliGRAM(s) Oral every 6 hours PRN  aluminum hydroxide/magnesium hydroxide/simethicone Suspension 30 milliLiter(s) Oral every 4 hours PRN  aspirin  chewable 81 milliGRAM(s) Oral daily  cefTRIAXone   IVPB 2000 milliGRAM(s) IV Intermittent every 24 hours  chlorhexidine 2% Cloths 1 Application(s) Topical daily  heparin   Injectable 5000 Unit(s) SubCutaneous every 8 hours  influenza  Vaccine (HIGH DOSE) 0.7 milliLiter(s) IntraMuscular once  melatonin 3 milliGRAM(s) Oral at bedtime PRN  metoprolol succinate ER 50 milliGRAM(s) Oral daily  ondansetron Injectable 4 milliGRAM(s) IV Push every 8 hours PRN    ALLERGIES  aloe vera (Unknown)  Cherries (Unknown)  latex (Unknown)  levofloxacin (Unknown)  melon (Unknown)  minocycline (Unknown)  Silvadene (Unknown)  Sulfacetamide Sodium (Unknown)      VITALS  T(C): 36.9 (10-31-22 @ 08:41), Max: 36.9 (10-31-22 @ 04:53)  HR: 79 (10-31-22 @ 08:41) (62 - 89)  BP: 151/84 (10-31-22 @ 08:41) (113/55 - 151/84)  RR: 18 (10-31-22 @ 08:41) (18 - 18)  SpO2: 95% (10-31-22 @ 08:41) (91% - 96%)  Wt(kg): --    PHYSICAL EXAM  Constitutional - NAD, Comfortable  HEENT - NCAT, EOMI  Neck - Supple, No limited ROM  Chest - CTA bilaterally, No wheeze, No rhonchi, No crackles  Cardiovascular - RRR, S1S2, No murmurs  Abdomen - BS+, Soft, NTND  Extremities - No C/C/E, No calf tenderness   Neurologic Exam -                    Cognitive - Awake, Alert, AAO to self, place, date, year, situation     Communication - Fluent, No dysarthria, no aphasia     Cranial Nerves - CN 2-12 intact     Motor - No focal deficits      Sensory - Intact to LT     Reflexes - DTR Intact, No primitive reflexive  Psychiatric - Mood stable, Affect WNL    RECENT LABS/IMAGING  CBC Full  -  ( 31 Oct 2022 07:17 )  WBC Count : 3.32 K/uL  RBC Count : 2.77 M/uL  Hemoglobin : 9.4 g/dL  Hematocrit : 29.4 %  Platelet Count - Automated : 290 K/uL  Mean Cell Volume : 106.1 fl  Mean Cell Hemoglobin : 33.9 pg  Mean Cell Hemoglobin Concentration : 32.0 gm/dL  Auto Neutrophil # : x  Auto Lymphocyte # : x  Auto Monocyte # : x  Auto Eosinophil # : x  Auto Basophil # : x  Auto Neutrophil % : x  Auto Lymphocyte % : x  Auto Monocyte % : x  Auto Eosinophil % : x  Auto Basophil % : x    10-31    139  |  104  |  30<H>  ----------------------------<  103<H>  4.4   |  25  |  1.39<H>    Ca    9.7      31 Oct 2022 07:14    Impression:   89 yo with functional deficits secondary to diagnosis of debility, encephalopathy    Plan:  PT- ROM, Bed Mob, Transfers, Amb w AD and bracing as needed  OT- ADLs, bracing  SLP- Dysphagia eval and treat  Prec- Falls, Cardiac  DVT Prophylaxis- Heparin  Skin- Turn q2 h  Dispo-  Patient is a 89y old  Female who presents with a chief complaint of concern for seizure activity (31 Oct 2022 08:39)    Ad,mission HPI:  89 year old female with hx of paroxysmal AFib, HFrEF, sinus arrest s/p pacemaker, PAD, aortic valve stenosis, and recent hospitalization for infected prosthetic knee joint who presents from [facility] rehab facility for concern of seizure activity. According to provider available at [facility] for rehab, the patient was sent to the hospital after an episode of limited responsiveness associated with jerking movements of the limbs. This episode lasted for about five minutes.     The patient herself did not recall the events that lead to her admissions. She states she was at the rehab facility, waiting to be transported to an outpatient appointment when she was brought to the hospital instead. She repeated states that 'it wasn't fair' that she was brought here instead without her personal belongings. She denies losing consciousness at any point, no confusion, no headaches.     The patient was AAOx2 at the time of evaluation. She could not recall the name of her rehab facility and didn't recall the name of the hospital where she recently had knee I&D. Per [facility] rehab facility, the patient is typically AAOx3. I attempted to call the patient's daughter as well for collateral; however, phone went straight to voicemail.    Per outpatient records (see [facility]), the patient was hospitalized earlier this month after a syncopal episode, and was found to have infected prosthetic knee joint requiring left knee I&D with poly exchange. She was treated with a course of vanc and zosyn, eventually discharged to [facility]. (27 Oct 2022 23:52)    Interval History:  Patient admitted to [facility]    CT Head No Cont (10.27.22 @ 18:49) >  No acute intracranial hemorrhage.  Age-indeterminate lacunar infarct right thalamus. Correlate clinically.   MRI exam recommended as clinically warranted.    EEG  `Clinical Impression:  Normal REEG  There were no epileptiform abnormalities recorded.      Diagnosed w toxic-metabolic encephalopathy.  Patient w persistent functional deficits.    REVIEW OF SYSTEMS: L knee and L ankle pain, difficulty walking, No chest pain, shortness of breath, nausea, vomiting or diarhea; other ROS neg     PAST MEDICAL & SURGICAL HISTORY  Type 2 diabetes mellitus  S/P knee surgery    FUNCTIONAL HISTORY:   Lives alone in senior living.  Gets assist w ADLs.  Was at [facility] prior to admission.     CURRENT FUNCTIONAL STATUS:  CG Transfers    FAMILY HISTORY   No pertinent family history in first degree relatives    MEDICATIONS   acetaminophen     Tablet .. 650 milliGRAM(s) Oral every 6 hours PRN  aluminum hydroxide/magnesium hydroxide/simethicone Suspension 30 milliLiter(s) Oral every 4 hours PRN  aspirin  chewable 81 milliGRAM(s) Oral daily  cefTRIAXone   IVPB 2000 milliGRAM(s) IV Intermittent every 24 hours  chlorhexidine 2% Cloths 1 Application(s) Topical daily  heparin   Injectable 5000 Unit(s) SubCutaneous every 8 hours  influenza  Vaccine (HIGH DOSE) 0.7 milliLiter(s) IntraMuscular once  melatonin 3 milliGRAM(s) Oral at bedtime PRN  metoprolol succinate ER 50 milliGRAM(s) Oral daily  ondansetron Injectable 4 milliGRAM(s) IV Push every 8 hours PRN    ALLERGIES  aloe vera (Unknown)  Cherries (Unknown)  latex (Unknown)  levofloxacin (Unknown)  melon (Unknown)  minocycline (Unknown)  Silvadene (Unknown)  Sulfacetamide Sodium (Unknown)      VITALS  T(C): 36.9 (10-31-22 @ 08:41), Max: 36.9 (10-31-22 @ 04:53)  HR: 79 (10-31-22 @ 08:41) (62 - 89)  BP: 151/84 (10-31-22 @ 08:41) (113/55 - 151/84)  RR: 18 (10-31-22 @ 08:41) (18 - 18)  SpO2: 95% (10-31-22 @ 08:41) (91% - 96%)  Wt(kg): --    PHYSICAL EXAM  Constitutional - NAD, Comfortable  HEENT - NCAT, EOMI  Neck - Supple, No limited ROM  Chest - CTA bilaterally, No wheeze, No rhonchi, No crackles  Cardiovascular - RRR, S1S2, No murmurs  Abdomen - BS+, Soft, NTND  Extremities - LLE knee dressing intact, Stasis changes distal LLE  No calf tenderness   Neurologic Exam -                 AAO x 3  Motor nml grade- LLE decreased due to pain  No clonus   Psychiatric - Mood stable, Affect WNL    RECENT LABS/IMAGING  CBC Full  -  ( 31 Oct 2022 07:17 )  WBC Count : 3.32 K/uL  RBC Count : 2.77 M/uL  Hemoglobin : 9.4 g/dL  Hematocrit : 29.4 %  Platelet Count - Automated : 290 K/uL  Mean Cell Volume : 106.1 fl  Mean Cell Hemoglobin : 33.9 pg  Mean Cell Hemoglobin Concentration : 32.0 gm/dL  Auto Neutrophil # : x  Auto Lymphocyte # : x  Auto Monocyte # : x  Auto Eosinophil # : x  Auto Basophil # : x  Auto Neutrophil % : x  Auto Lymphocyte % : x  Auto Monocyte % : x  Auto Eosinophil % : x  Auto Basophil % : x    10-31    139  |  104  |  30<H>  ----------------------------<  103<H>  4.4   |  25  |  1.39<H>    Ca    9.7      31 Oct 2022 07:14    Impression:   89 yo with functional deficits secondary to diagnosis of debility, encephalopathy    Plan:  PT- ROM, Bed Mob, Transfers, Amb w AD and bracing as needed  OT- ADLs, bracing  SLP- Dysphagia eval and treat  Prec- Falls, Cardiac  DVT Prophylaxis- Heparin  Skin- Turn q2 h  Dispo- Recommend return to [facility]

## 2022-10-31 NOTE — GOALS OF CARE CONVERSATION - ADVANCED CARE PLANNING - CONVERSATION DETAILS
This alert and oriented x 4 patient was seen today for goals of care planning and conversation. Introduced self and role of PCE. Patient states understanding of health status  and prognosis. Patient states  she lives alone in an apartment and has been independent with all aspects of ADLs prior to admission. Patient  expressed interest in HCP. Same completed at the bedside. The original document was given to patient and copy placed in chart for medical records. My HCP knows my wishes on artificial nutrition and hydration. Patient identified  daughter Juan Bob  [phone] as the agent and no alternate identified.    CPR , intubation, artificial nutrition  procedures and possible complications explained. Patient watched informational video on choosing a health care agent. Patient expressed wish to  receive CPR as well as to be have a time trial period of Intubation and artificial nutrition. Patient states she has one daughter and 3 grand-children and she would lone to be around for hier grand-children.  Explained hospital base Full Code policy, verbalise understanding. Patient aware she will remain FULL CODE and will discuss with HCP, /daughter     Contact information for further needs provided.  No [employer] exemptions noted.      Yadira Gallegos  MSN -ED RN [facility]     [phone]

## 2022-11-01 RX ADMIN — Medication 30 MILLILITER(S): at 21:37

## 2022-11-01 RX ADMIN — POLYETHYLENE GLYCOL 3350 17 GRAM(S): 17 POWDER, FOR SOLUTION ORAL at 11:18

## 2022-11-01 RX ADMIN — Medication 81 MILLIGRAM(S): at 11:18

## 2022-11-01 RX ADMIN — HEPARIN SODIUM 5000 UNIT(S): 5000 INJECTION INTRAVENOUS; SUBCUTANEOUS at 21:32

## 2022-11-01 RX ADMIN — CHLORHEXIDINE GLUCONATE 1 APPLICATION(S): 213 SOLUTION TOPICAL at 12:24

## 2022-11-01 RX ADMIN — HEPARIN SODIUM 5000 UNIT(S): 5000 INJECTION INTRAVENOUS; SUBCUTANEOUS at 14:27

## 2022-11-01 RX ADMIN — Medication 650 MILLIGRAM(S): at 11:18

## 2022-11-01 RX ADMIN — Medication 650 MILLIGRAM(S): at 11:48

## 2022-11-01 RX ADMIN — Medication 50 MILLIGRAM(S): at 05:38

## 2022-11-01 RX ADMIN — HEPARIN SODIUM 5000 UNIT(S): 5000 INJECTION INTRAVENOUS; SUBCUTANEOUS at 05:38

## 2022-11-01 RX ADMIN — CEFTRIAXONE 100 MILLIGRAM(S): 500 INJECTION, POWDER, FOR SOLUTION INTRAMUSCULAR; INTRAVENOUS at 21:31

## 2022-11-01 NOTE — OCCUPATIONAL THERAPY INITIAL EVALUATION ADULT - PERTINENT HX OF CURRENT PROBLEM, REHAB EVAL
89 year old female with hx of paroxysmal AFib, HFrEF, sinus arrest s/p pacemaker, PAD, aortic valve stenosis, and recent hospitalization for infected prosthetic knee joint who presents from [facility] rehab facility for concern of seizure activity. According to provider available at [facility] for rehab, the patient was sent to the hospital after an episode of limited responsiveness associated with jerking movements of the limbs. This episode lasted for about five minutes. The patient herself did not recall the events that lead to her admissions. She states she was at the rehab facility, waiting to be transported to an outpatient appointment when she was brought to the hospital instead. She repeated states that 'it wasn't fair' that she was brought here instead without her personal belongings. She denies losing consciousness at any point, no confusion, no headaches. The patient was AAOx2 at the time of evaluation. She could not recall the name of her rehab facility and didn't recall the name of the hospital where she recently had knee I&D. Per [facility] rehab facility, the patient is typically AAOx3. I attempted to call the patient's daughter as well for collateral; however, phone went straight to voicemail. Per outpatient records (see [facility]), the patient was hospitalized earlier this month after a syncopal episode, and was found to have infected prosthetic knee joint requiring left knee I&D with poly exchange. She was treated with a course of vanc and zosyn, eventually discharged to [facility].    BLE Dopplers: (-)  Xray L Knee: Status post left TKR. No acute osseous abnormality. No bone destruction. Nonspecific soft tissue swelling and gas lucency as described. Correlate clinically for infection and with  CT or MR as warranted

## 2022-11-01 NOTE — OCCUPATIONAL THERAPY INITIAL EVALUATION ADULT - LIVES WITH, PROFILE
Pt mildly confused, states she came from rehab, prior lived in apartment alone, ambulates with RW/SC, states daughter assist with IADLs as needed, I in ADLs

## 2022-11-01 NOTE — OCCUPATIONAL THERAPY INITIAL EVALUATION ADULT - BALANCE DISTURBANCE, IDENTIFIED IMPAIRMENT CONTRIBUTE, REHAB EVAL
impaired postural control/decreased ROM/decreased strength impaired motor control/impaired postural control/decreased ROM/decreased strength

## 2022-11-02 VITALS
OXYGEN SATURATION: 97 % | RESPIRATION RATE: 16 BRPM | TEMPERATURE: 98 F | HEART RATE: 74 BPM | SYSTOLIC BLOOD PRESSURE: 92 MMHG | DIASTOLIC BLOOD PRESSURE: 54 MMHG

## 2022-11-02 PROBLEM — E11.9 TYPE 2 DIABETES MELLITUS WITHOUT COMPLICATIONS: Chronic | Status: ACTIVE | Noted: 2022-10-28

## 2022-11-02 LAB
ANION GAP SERPL CALC-SCNC: 9 MMOL/L — SIGNIFICANT CHANGE UP (ref 5–17)
BUN SERPL-MCNC: 26 MG/DL — HIGH (ref 7–23)
CALCIUM SERPL-MCNC: 9.9 MG/DL — SIGNIFICANT CHANGE UP (ref 8.4–10.5)
CHLORIDE SERPL-SCNC: 104 MMOL/L — SIGNIFICANT CHANGE UP (ref 96–108)
CO2 SERPL-SCNC: 24 MMOL/L — SIGNIFICANT CHANGE UP (ref 22–31)
CREAT SERPL-MCNC: 1.29 MG/DL — SIGNIFICANT CHANGE UP (ref 0.5–1.3)
EGFR: 39 ML/MIN/1.73M2 — LOW
GLUCOSE SERPL-MCNC: 111 MG/DL — HIGH (ref 70–99)
HCT VFR BLD CALC: 27.3 % — LOW (ref 34.5–45)
HGB BLD-MCNC: 8.9 G/DL — LOW (ref 11.5–15.5)
MCHC RBC-ENTMCNC: 32.6 GM/DL — SIGNIFICANT CHANGE UP (ref 32–36)
MCHC RBC-ENTMCNC: 34.4 PG — HIGH (ref 27–34)
MCV RBC AUTO: 105.4 FL — HIGH (ref 80–100)
NRBC # BLD: 0 /100 WBCS — SIGNIFICANT CHANGE UP (ref 0–0)
PLATELET # BLD AUTO: 259 K/UL — SIGNIFICANT CHANGE UP (ref 150–400)
POTASSIUM SERPL-MCNC: 4.8 MMOL/L — SIGNIFICANT CHANGE UP (ref 3.5–5.3)
POTASSIUM SERPL-SCNC: 4.8 MMOL/L — SIGNIFICANT CHANGE UP (ref 3.5–5.3)
RBC # BLD: 2.59 M/UL — LOW (ref 3.8–5.2)
RBC # FLD: 14.1 % — SIGNIFICANT CHANGE UP (ref 10.3–14.5)
SARS-COV-2 RNA SPEC QL NAA+PROBE: SIGNIFICANT CHANGE UP
SODIUM SERPL-SCNC: 137 MMOL/L — SIGNIFICANT CHANGE UP (ref 135–145)
WBC # BLD: 3.67 K/UL — LOW (ref 3.8–10.5)
WBC # FLD AUTO: 3.67 K/UL — LOW (ref 3.8–10.5)

## 2022-11-02 PROCEDURE — 85652 RBC SED RATE AUTOMATED: CPT

## 2022-11-02 PROCEDURE — 36415 COLL VENOUS BLD VENIPUNCTURE: CPT

## 2022-11-02 PROCEDURE — 82435 ASSAY OF BLOOD CHLORIDE: CPT

## 2022-11-02 PROCEDURE — 87640 STAPH A DNA AMP PROBE: CPT

## 2022-11-02 PROCEDURE — 87086 URINE CULTURE/COLONY COUNT: CPT

## 2022-11-02 PROCEDURE — U0005: CPT

## 2022-11-02 PROCEDURE — 71045 X-RAY EXAM CHEST 1 VIEW: CPT

## 2022-11-02 PROCEDURE — 82330 ASSAY OF CALCIUM: CPT

## 2022-11-02 PROCEDURE — 83735 ASSAY OF MAGNESIUM: CPT

## 2022-11-02 PROCEDURE — 70450 CT HEAD/BRAIN W/O DYE: CPT | Mod: MA

## 2022-11-02 PROCEDURE — 82803 BLOOD GASES ANY COMBINATION: CPT

## 2022-11-02 PROCEDURE — 99285 EMERGENCY DEPT VISIT HI MDM: CPT | Mod: 25

## 2022-11-02 PROCEDURE — 80053 COMPREHEN METABOLIC PANEL: CPT

## 2022-11-02 PROCEDURE — 84443 ASSAY THYROID STIM HORMONE: CPT

## 2022-11-02 PROCEDURE — 97530 THERAPEUTIC ACTIVITIES: CPT

## 2022-11-02 PROCEDURE — 84132 ASSAY OF SERUM POTASSIUM: CPT

## 2022-11-02 PROCEDURE — 84295 ASSAY OF SERUM SODIUM: CPT

## 2022-11-02 PROCEDURE — 84484 ASSAY OF TROPONIN QUANT: CPT

## 2022-11-02 PROCEDURE — 0225U NFCT DS DNA&RNA 21 SARSCOV2: CPT

## 2022-11-02 PROCEDURE — 82607 VITAMIN B-12: CPT

## 2022-11-02 PROCEDURE — 97161 PT EVAL LOW COMPLEX 20 MIN: CPT

## 2022-11-02 PROCEDURE — 97110 THERAPEUTIC EXERCISES: CPT

## 2022-11-02 PROCEDURE — 83605 ASSAY OF LACTIC ACID: CPT

## 2022-11-02 PROCEDURE — 81001 URINALYSIS AUTO W/SCOPE: CPT

## 2022-11-02 PROCEDURE — 85027 COMPLETE CBC AUTOMATED: CPT

## 2022-11-02 PROCEDURE — 84100 ASSAY OF PHOSPHORUS: CPT

## 2022-11-02 PROCEDURE — 86140 C-REACTIVE PROTEIN: CPT

## 2022-11-02 PROCEDURE — 85018 HEMOGLOBIN: CPT

## 2022-11-02 PROCEDURE — 82962 GLUCOSE BLOOD TEST: CPT

## 2022-11-02 PROCEDURE — U0003: CPT

## 2022-11-02 PROCEDURE — 95816 EEG AWAKE AND DROWSY: CPT

## 2022-11-02 PROCEDURE — 85014 HEMATOCRIT: CPT

## 2022-11-02 PROCEDURE — 93970 EXTREMITY STUDY: CPT

## 2022-11-02 PROCEDURE — 97116 GAIT TRAINING THERAPY: CPT

## 2022-11-02 PROCEDURE — 80048 BASIC METABOLIC PNL TOTAL CA: CPT

## 2022-11-02 PROCEDURE — 97166 OT EVAL MOD COMPLEX 45 MIN: CPT

## 2022-11-02 PROCEDURE — 87641 MR-STAPH DNA AMP PROBE: CPT

## 2022-11-02 PROCEDURE — 82947 ASSAY GLUCOSE BLOOD QUANT: CPT

## 2022-11-02 PROCEDURE — 85025 COMPLETE CBC W/AUTO DIFF WBC: CPT

## 2022-11-02 PROCEDURE — 73562 X-RAY EXAM OF KNEE 3: CPT

## 2022-11-02 RX ORDER — LISINOPRIL 2.5 MG/1
1 TABLET ORAL
Qty: 0 | Refills: 0 | DISCHARGE

## 2022-11-02 RX ORDER — CEFTRIAXONE 500 MG/1
2 INJECTION, POWDER, FOR SOLUTION INTRAMUSCULAR; INTRAVENOUS
Qty: 0 | Refills: 0 | DISCHARGE
Start: 2022-11-02

## 2022-11-02 RX ORDER — CEFTRIAXONE 500 MG/1
0 INJECTION, POWDER, FOR SOLUTION INTRAMUSCULAR; INTRAVENOUS
Qty: 0 | Refills: 0 | DISCHARGE

## 2022-11-02 RX ORDER — LANOLIN ALCOHOL/MO/W.PET/CERES
1 CREAM (GRAM) TOPICAL
Qty: 0 | Refills: 0 | DISCHARGE
Start: 2022-11-02

## 2022-11-02 RX ADMIN — HEPARIN SODIUM 5000 UNIT(S): 5000 INJECTION INTRAVENOUS; SUBCUTANEOUS at 05:15

## 2022-11-02 RX ADMIN — CHLORHEXIDINE GLUCONATE 1 APPLICATION(S): 213 SOLUTION TOPICAL at 13:20

## 2022-11-02 RX ADMIN — Medication 50 MILLIGRAM(S): at 05:15

## 2022-11-02 RX ADMIN — Medication 81 MILLIGRAM(S): at 11:57

## 2022-11-02 RX ADMIN — HEPARIN SODIUM 5000 UNIT(S): 5000 INJECTION INTRAVENOUS; SUBCUTANEOUS at 13:03

## 2022-11-02 NOTE — PROGRESS NOTE ADULT - ASSESSMENT
89 year old female with  paroxysmal AFib, HFrEF, sinus arrest s/p pacemaker, PAD, aortic valve stenosis, and recent hospitalization for infected prosthetic knee joint who presents from [facility] rehab facility for concern of seizure activity. According to provider available at [facility] for rehab, the patient was sent to the hospital after an episode of limited responsiveness associated with jerking movements of the limbs. This episode lasted for about five minutes.    no LOC, no tongue bite    CTH chronic R thalamic infarct (read as age indeterminate   EEG neg   ESR 70; CRP 65   o/e AAOx2-3, BELLE   NIHSS=8 premrs 3    Impression:   1) seizure like event    2) chronic R thalamic stroke. small vessel in etiology   3) AMS likely toxic metabolic on top of dementia     - for AF should be on DOAC if no contraindication. should at lease be on asa 81mg in mean time if able   , asa started   - should also be on statin therapy for old/chronic thalamic infarct, LDL goal < 70   - CTX for infection/ knee effusion through 11/22   - no need for MRI, unlikely to    - would hold off AED unless has another event.    - Hemoglobin A1c and lipid panel  - outpatient vessel imaging CD/TCD for completion of stroke workup.   - PT/OT/SS/SLP, OOBC  - check FS, glucose control <180  - GI/DVT ppx  - Thank you for allowing me to participate in the care of this patient. Call with questions.   - d/c planning   Stephen Max MD  Vascular Neurology  Office: [phone] 
89F with paroxysmal AFib, HFrEF, sinus arrest s/p PPM, PAD, aortic stenosis, and recent hospitalization at [facility] for apparent infection of or around prosthetic knee joint (s/p poly exchange, reportedly grew GBS, planned for 6 weeks ceftriaxone starting 10/11/22), who was admitted 10/27/22 from [facility] rehab facility for concern of seizure activity.     Impression:  #Recent L Knee PJI   Reportedly grew GBS on cultures  Was discharged on ceftriaxone with plan to complete 6 week course  No evidence of acute infection at this time    10/30:  ESR/ CRP = 70/65    Recs:  Continue ceftriaxone to complete 6 week course for PJI  begun 10/11 - continue through 11/22/22  PT eval    
89 year old female with hx of paroxysmal AFib, HFrEF, sinus arrest s/p pacemaker, PAD, aortic valve stenosis, and recent hospitalization for infected prosthetic knee joint who presents from [facility] rehab facility for concern of seizure activity. The patient has no recollection of the event, states she did not loose consciousness but according to ROGER, the patient was observed with limb jerking motions and minimal responsiveness.
89 year old female with hx of paroxysmal AFib, HFrEF, sinus arrest s/p pacemaker, PAD, aortic valve stenosis, and recent hospitalization for infected prosthetic knee joint who presents from [facility] rehab facility for concern of seizure activity. The patient has no recollection of the event, states she did not loose consciousness but according to ROGER, the patient was observed with limb jerking motions and minimal responsiveness.
89 year old female with  paroxysmal AFib, HFrEF, sinus arrest s/p pacemaker, PAD, aortic valve stenosis, and recent hospitalization for infected prosthetic knee joint who presents from [facility] rehab facility for concern of seizure activity. According to provider available at [facility] for rehab, the patient was sent to the hospital after an episode of limited responsiveness associated with jerking movements of the limbs. This episode lasted for about five minutes.    no LOC, no tongue bite    CTH chronic R thalamic infarct (read as age indeterminate   EEG neg   ESR 70; CRP 65   o/e AAOx2-3, BELLE   NIHSS=8 premrs 3    Impression:   1) seizure like event    2) chronic R thalamic stroke. small vessel in etiology   3) AMS likely toxic metabolic on top of dementia     - for AF should be on DOAC if no contraindication. now on asa 81mg in mean time.     - should also be on statin therapy for old/chronic thalamic infarct, LDL goal < 70   - CTX for infection/ knee effusion through 11/22   - no need for MRI, unlikely to    - would hold off AED unless has another event.    - Hemoglobin A1c and lipid panel  - outpatient vessel imaging CD/TCD for completion of stroke workup.   - PT/OT/SS/SLP, OOBC  - check FS, glucose control <180  - GI/DVT ppx  - Thank you for allowing me to participate in the care of this patient. Call with questions.   Stephen Max MD  Vascular Neurology  Office: [phone] 
89 year old female with  paroxysmal AFib, HFrEF, sinus arrest s/p pacemaker, PAD, aortic valve stenosis, and recent hospitalization for infected prosthetic knee joint who presents from [facility] rehab facility for concern of seizure activity. According to provider available at [facility] for rehab, the patient was sent to the hospital after an episode of limited responsiveness associated with jerking movements of the limbs. This episode lasted for about five minutes.    no LOC, no tongue bite    CTH chronic R thalamic infarct (read as age indeterminate   EEG neg   ESR 70; CRP 65   o/e AAOx2-3, BELLE   NIHSS=8 premrs 3    Impression:   1) seizure like event    2) chronic R thalamic stroke. small vessel in etiology   3) AMS likely toxic metabolic on top of dementia     - for AF should be on DOAC if no contraindication. should at lease be on asa 81mg in mean time if able     - should also be on statin therapy for old/chronic thalamic infarct, LDL goal < 70   - CTX for infection/ knee effusion through 11/22   - no need for MRI, unlikely to    - would hold off AED unless has another event.    - Hemoglobin A1c and lipid panel  - outpatient vessel imaging CD/TCD for completion of stroke workup.   - PT/OT/SS/SLP, OOBC  - check FS, glucose control <180  - GI/DVT ppx  - Thank you for allowing me to participate in the care of this patient. Call with questions.   Stephen Max MD  Vascular Neurology  Office: [phone] 
89 year old female with hx of paroxysmal AFib, HFrEF, sinus arrest s/p pacemaker, PAD, aortic valve stenosis, and recent hospitalization for infected prosthetic knee joint who presents from [facility] rehab facility for concern of seizure activity. The patient has no recollection of the event, states she did not loose consciousness but according to ROGER, the patient was observed with limb jerking motions and minimal responsiveness.

## 2022-11-02 NOTE — PROGRESS NOTE ADULT - PROBLEM SELECTOR PROBLEM 4
Elevated serum creatinine

## 2022-11-02 NOTE — PROGRESS NOTE ADULT - PROBLEM SELECTOR PLAN 3
IV abx  ID eval appreciated
IV abx  ID eval appreciated
Per outpatient records (see [facility]), the patient was hospitalized earlier this month after a syncopal episode, and was found to have infected prosthetic knee joint requiring left knee I&D with poly exchange. She was treated with a course of vanc and zosyn, eventually discharged to [facility].
IV abx  ID eval appreciated

## 2022-11-02 NOTE — PROGRESS NOTE ADULT - PROBLEM SELECTOR PLAN 1
- EEG: No epileptiform activity  -Neuro eval called
- EEG: No epileptiform activity  -Neuro eval reviewed
- EEG: No epileptiform activity  -Neuro eval
- EEG: No epileptiform activity  -Neuro eval reviewed
- EEG: No epileptiform activity  -Neuro eval called
- EEG: No epileptiform activity  -Neuro eval reviewed

## 2022-11-02 NOTE — PROGRESS NOTE ADULT - PROBLEM SELECTOR PLAN 2
- on CTX for Left knee infection through 11/22
- on CTX for Left knee infection through 11/22
- will start cefazolin  -  lower extremity duplex: No DVT
- on CTX for Left knee infection through 11/22
- will start cefazolin  -  lower extremity duplex: No DVT
- on CTX for Left knee infection through 11/22

## 2022-11-02 NOTE — PROGRESS NOTE ADULT - PROBLEM SELECTOR PLAN 5
- DVT ppx: heparin subq  - GI ppx: none  - Diet: regular

## 2022-11-02 NOTE — PROGRESS NOTE ADULT - PROBLEM SELECTOR PROBLEM 2
Lower extremity cellulitis

## 2022-11-02 NOTE — DISCHARGE NOTE NURSING/CASE MANAGEMENT/SOCIAL WORK - NSDCPEFALRISK_GEN_ALL_CORE
For information on Fall & Injury Prevention, visit: [email or web address] OR  [email or web address] OR  https://www.cdc.gov/steadi/patient.html

## 2022-11-02 NOTE — PROGRESS NOTE ADULT - PROBLEM SELECTOR PROBLEM 3
S/P knee surgery

## 2022-11-02 NOTE — DISCHARGE NOTE PROVIDER - CARE PROVIDER_API CALL
Stephen Max)  Neurology; Vascular Neurology  [address], [address]  [place] [address]  Phone: [phone]  Fax: [phone]  Follow Up Time: 2 weeks

## 2022-11-02 NOTE — DISCHARGE NOTE NURSING/CASE MANAGEMENT/SOCIAL WORK - PATIENT PORTAL LINK FT
You can access the FollowMyHealth Patient Portal offered by [facility] by registering at the following website: [email or web address]. By joining [employer]’s FollowMyHealth portal, you will also be able to view your health information using other applications (apps) compatible with our system.

## 2022-11-02 NOTE — DISCHARGE NOTE PROVIDER - HOSPITAL COURSE
89 year old female with hx of paroxysmal AFib, HFrEF, sinus arrest s/p pacemaker, PAD, aortic valve stenosis, and recent hospitalization for infected prosthetic knee joint who presents from [facility] rehab facility for concern of seizure activity. The patient has no recollection of the event, states she did not lose consciousness but according to ROGER, the patient was observed with limb jerking motions and minimal responsiveness.      Problem/Plan - 1:  ·  Problem: Unresponsive episode.   ·  Plan: - EEG: No epileptiform activity  -Neuro eval reviewed.     Problem/Plan - 2:  ·  Problem: Lower extremity cellulitis.   ·  Plan: - on CTX for Left knee infection through 11/22.     Problem/Plan - 3:  ·  Problem: S/P knee surgery.   ·  Plan: IV abx     Problem/Plan - 4:  ·  Problem: Elevated serum creatinine.   ·  Plan: - caution with nephrotoxic agents.     Problem/Plan - 5:  ·  Problem: Prophylactic measure.   ·  Plan: - DVT ppx: heparin subq  - GI ppx: none  - Diet: regular.

## 2022-11-02 NOTE — PROGRESS NOTE ADULT - PROBLEM SELECTOR PLAN 4
- caution with nephrotoxic agents

## 2022-11-02 NOTE — DISCHARGE NOTE PROVIDER - NSDCCPCAREPLAN_GEN_ALL_CORE_FT
PRINCIPAL DISCHARGE DIAGNOSIS  Diagnosis: Unresponsive episode  Assessment and Plan of Treatment: EEG showed no epileptiform activity  pt advised by neurology to go for outpatient vessel imaging CD/TCD for completion of stroke workup      SECONDARY DISCHARGE DIAGNOSES  Diagnosis: Lower extremity cellulitis  Assessment and Plan of Treatment: pt placed on course of Ceftriaxone, continue through 11/22    Diagnosis: Elevated serum creatinine  Assessment and Plan of Treatment: refrain from nephrotoxic agents    Diagnosis: Knee effusion  Assessment and Plan of Treatment: s/p knee surgery, continue ceftriaxone antibiotic for left knee infection     PRINCIPAL DISCHARGE DIAGNOSIS  Diagnosis: Unresponsive episode  Assessment and Plan of Treatment: EEG showed no epileptiform activity  pt advised by neurology to go for outpatient vessel imaging CD/TCD for completion of stroke workup      SECONDARY DISCHARGE DIAGNOSES  Diagnosis: Elevated serum creatinine  Assessment and Plan of Treatment: refrain from nephrotoxic agents    Diagnosis: Lower extremity cellulitis  Assessment and Plan of Treatment: pt placed on course of Ceftriaxone, continue through 11/22    Diagnosis: Knee effusion  Assessment and Plan of Treatment: s/p knee surgery, continue ceftriaxone antibiotic for left knee infection

## 2022-11-02 NOTE — PROGRESS NOTE ADULT - PROVIDER SPECIALTY LIST ADULT
Neurology
Infectious Disease
Internal Medicine

## 2022-11-02 NOTE — PROGRESS NOTE ADULT - REASON FOR ADMISSION
concern for seizure activity

## 2022-11-02 NOTE — DISCHARGE NOTE PROVIDER - NSDCMRMEDTOKEN_GEN_ALL_CORE_FT
aspirin 81 mg oral tablet, chewable: 1 tab(s) orally once a day  cefTRIAXone 2 g intravenous injection: infuse 2 grams once a day for 42 days last day 11-20-22  Daily Multi oral tablet: 1 tab(s) orally once a day  lisinopril 10 mg oral tablet: 1 tab(s) orally once a day  MiraLax oral powder for reconstitution: 17 gram(s) orally once a day  Pepcid 20 mg oral tablet: 1 tab(s) orally once a day (at bedtime)  Toprol-XL 50 mg oral tablet, extended release: 1 tab(s) orally 2 times a day  torsemide 20 mg oral tablet: 1 tab(s) orally once a day  Tylenol 325 mg oral tablet: 3 tab(s) orally every 8 hours, As Needed  Vitamin C 500 mg oral tablet: 1 tab(s) orally once a day  Vitamin D3 25 mcg (1000 intl units) oral tablet: 1 tab(s) orally once a day   aspirin 81 mg oral tablet, chewable: 1 tab(s) orally once a day  cefTRIAXone: 2 milligram(s) intravenous once a day until 11./22  Patient has PICC in place   Daily Multi oral tablet: 1 tab(s) orally once a day  melatonin 3 mg oral tablet: 1 tab(s) orally once a day (at bedtime), As needed, Insomnia  MiraLax oral powder for reconstitution: 17 gram(s) orally once a day  Pepcid 20 mg oral tablet: 1 tab(s) orally once a day (at bedtime)  Toprol-XL 50 mg oral tablet, extended release: 1 tab(s) orally 2 times a day  Tylenol 325 mg oral tablet: 3 tab(s) orally every 8 hours, As Needed  Vitamin C 500 mg oral tablet: 1 tab(s) orally once a day  Vitamin D3 25 mcg (1000 intl units) oral tablet: 1 tab(s) orally once a day

## 2022-11-02 NOTE — PROGRESS NOTE ADULT - SUBJECTIVE AND OBJECTIVE BOX
Neurology Progress Note    S: Patient seen and examined. No new events overnight. patient denied CP, SOB, HA or pain.     Medication:  acetaminophen     Tablet .. 650 milliGRAM(s) Oral every 6 hours PRN  aluminum hydroxide/magnesium hydroxide/simethicone Suspension 30 milliLiter(s) Oral every 4 hours PRN  aspirin  chewable 81 milliGRAM(s) Oral daily  cefTRIAXone   IVPB 2000 milliGRAM(s) IV Intermittent every 24 hours  chlorhexidine 2% Cloths 1 Application(s) Topical daily  heparin   Injectable 5000 Unit(s) SubCutaneous every 8 hours  influenza  Vaccine (HIGH DOSE) 0.7 milliLiter(s) IntraMuscular once  melatonin 3 milliGRAM(s) Oral at bedtime PRN  metoprolol succinate ER 50 milliGRAM(s) Oral daily  ondansetron Injectable 4 milliGRAM(s) IV Push every 8 hours PRN      Vitals:  Vital Signs Last 24 Hrs  T(C): 36.9 (31 Oct 2022 08:41), Max: 37 (30 Oct 2022 12:53)  T(F): 98.5 (31 Oct 2022 08:41), Max: 98.6 (30 Oct 2022 12:53)  HR: 79 (31 Oct 2022 08:41) (62 - 89)  BP: 151/84 (31 Oct 2022 08:41) (113/55 - 151/84)  BP(mean): --  RR: 18 (31 Oct 2022 08:41) (18 - 18)  SpO2: 95% (31 Oct 2022 08:41) (91% - 96%)    Parameters below as of 31 Oct 2022 08:41  Patient On (Oxygen Delivery Method): room air        General Exam:   General Appearance: Appropriately dressed and in no acute distress       Head: Normocephalic, atraumatic and no dysmorphic features  Ear, Nose, and Throat: Moist mucous membranes  CVS: S1S2+  Resp: No SOB, no wheeze or rhonchi  Abd: soft NTND  Extremities: No edema, no cyanosis  Skin: No bruises, no rashes     Neurological Exam:  Mental Status: Awake, alert and oriented x 2-3.  Able to follow simple and complex verbal commands. Able to name and repeat. fluent speech. No obvious aphasia or dysarthria noted.   Cranial Nerves: PERRL, EOMI, VFFC, sensation V1-V3 intact,  no obvious facial asymmetry, equal elevation of palate, scm/trap 5/5, tongue is midline on protrusion. no obvious papilledema on fundoscopic exam. hearing is grossly intact.   Motor: BELLE at least 3-4/5   Sensation: Intact to light touch and pinprick throughout. no right/left confusion. no extinction to tactile on DSS.   Reflexes: 1+ throughout at biceps, brachioradialis, triceps, patellars and ankles bilaterally and equal. No clonus. R toe and L toe were both downgoing.  Coordination: No dysmetria on FNF    Gait:deferred     I personally reviewed the below data/images/labs:      CBC Full  -  ( 31 Oct 2022 07:17 )  WBC Count : 3.32 K/uL  RBC Count : 2.77 M/uL  Hemoglobin : 9.4 g/dL  Hematocrit : 29.4 %  Platelet Count - Automated : 290 K/uL  Mean Cell Volume : 106.1 fl  Mean Cell Hemoglobin : 33.9 pg  Mean Cell Hemoglobin Concentration : 32.0 gm/dL  Auto Neutrophil # : x  Auto Lymphocyte # : x  Auto Monocyte # : x  Auto Eosinophil # : x  Auto Basophil # : x  Auto Neutrophil % : x  Auto Lymphocyte % : x  Auto Monocyte % : x  Auto Eosinophil % : x  Auto Basophil % : x    10-31    139  |  104  |  30<H>  ----------------------------<  103<H>  4.4   |  25  |  1.39<H>    Ca    9.7      31 Oct 2022 07:14          < from: CT Head No Cont (10.27.22 @ 18:49) >    ACC: [id number] EXAM:  CT BRAIN                          PROCEDURE DATE:  10/27/2022          INTERPRETATION:  CLINICAL INDICATION: Seizure-like activity. Altered   mental status.    TECHNIQUE: Noncontrast CT of the head was performed.    Multiple contiguous axial images were acquired from the skull base to the   vertex without the administration of intravenous contrast. Coronal and   sagittal reformations were made.    COMPARISON: None.    FINDINGS:  Mild prominence of the sulci and ventricles are consistent with   age-appropriate volume loss. There are hemispheric white matter areas of   low attenuation which are nonspecific but likely related to sequelae of   microvascular disease. 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    Age-indeterminate lacunar infarct right thalamus.    There is no intraparenchymal hematoma, mass effect or midline shift. The   basal cisterns are patent.  No abnormal extra-axial fluid collections are   present.    The visualized paranasal sinuses are clear. The mastoid air cells and   middle ear cavities are clear.    Cranium is intact.        IMPRESSION:    No acute intracranial hemorrhage.    Age-indeterminate lacunar infarct right thalamus. Correlate clinically.   MRI exam recommended as clinically warranted.    --- End of Report ---        KECIA KENT MD; Resident Radiology  This document has been electronically signed.  WERNER ARELLANO MD; Attending Radiologist  This document has been electronically signed. Oct 27 2022  7:23PM    < end of copied text >          Normal REEG  There were no epileptiform abnormalities recorded.             
Patient is a 89y old  Female who presents with a chief complaint of concern for seizure activity (27 Oct 2022 23:52)      SUBJECTIVE / OVERNIGHT EVENTS:    Events noted.  No CP/SOB  More alert  No cough    MEDICATIONS  (STANDING):  aspirin  chewable 81 milliGRAM(s) Oral daily  ceFAZolin   IVPB      ceFAZolin   IVPB 1000 milliGRAM(s) IV Intermittent every 8 hours  heparin   Injectable 5000 Unit(s) SubCutaneous every 8 hours  influenza  Vaccine (HIGH DOSE) 0.7 milliLiter(s) IntraMuscular once  metoprolol succinate ER 50 milliGRAM(s) Oral daily    MEDICATIONS  (PRN):  acetaminophen     Tablet .. 650 milliGRAM(s) Oral every 6 hours PRN Temp greater or equal to 38C (100.4F), Mild Pain (1 - 3)  aluminum hydroxide/magnesium hydroxide/simethicone Suspension 30 milliLiter(s) Oral every 4 hours PRN Dyspepsia  melatonin 3 milliGRAM(s) Oral at bedtime PRN Insomnia  ondansetron Injectable 4 milliGRAM(s) IV Push every 8 hours PRN Nausea and/or Vomiting        CAPILLARY BLOOD GLUCOSE        I&O's Summary    28 Oct 2022 07:01  -  29 Oct 2022 00:08  --------------------------------------------------------  IN: 240 mL / OUT: 550 mL / NET: -310 mL        T(C): 36.8 (10-28-22 @ 17:32), Max: 37 (10-28-22 @ 01:01)  HR: 68 (10-28-22 @ 17:32) (68 - 87)  BP: 159/71 (10-28-22 @ 17:32) (127/76 - 159/71)  RR: 16 (10-28-22 @ 17:32) (16 - 18)  SpO2: 96% (10-28-22 @ 17:32) (95% - 96%)    PHYSICAL EXAM:    NECK: Supple, No JVD  CHEST/LUNG: Clear to auscultation bilaterally; No wheezing.  HEART: Regular rate and rhythm; No murmurs, rubs, or gallops  ABDOMEN: Soft, Nontender, Nondistended; Bowel sounds present  EXTREMITIES:   No edema  NEUROLOGY: AAO       LABS:                        9.9    4.19  )-----------( 375      ( 28 Oct 2022 10:39 )             30.7     10-28    138  |  100  |  52<H>  ----------------------------<  153<H>  4.4   |  27  |  1.60<H>    Ca    10.2      28 Oct 2022 10:39  Phos  3.4     10-28  Mg     2.2     10-28    TPro  6.9  /  Alb  3.1<L>  /  TBili  0.2  /  DBili  x   /  AST  17  /  ALT  8<L>  /  AlkPhos  83  10-27          Urinalysis Basic - ( 27 Oct 2022 18:14 )    Color: Light Yellow / Appearance: Clear / S.012 / pH: x  Gluc: x / Ketone: Negative  / Bili: Negative / Urobili: Negative   Blood: x / Protein: Trace / Nitrite: Negative   Leuk Esterase: Small / RBC: 5 /hpf / WBC 3 /HPF   Sq Epi: x / Non Sq Epi: 3 /hpf / Bacteria: Negative      CAPILLARY BLOOD GLUCOSE            RADIOLOGY & ADDITIONAL TESTS:    Imaging Personally Reviewed:    Consultant(s) Notes Reviewed:      Care Discussed with Consultants/Other Providers:    Don Hassan MD, CMD, FACP    [id number] [id number]  Office Tel: [phone]  Cell: [phone]  
Patient is a 89y old  Female who presents with a chief complaint of concern for seizure activity (29 Oct 2022 18:15)      SUBJECTIVE / OVERNIGHT EVENTS:    Events noted.  CONSTITUTIONAL: No fever,  or fatigue  RESPIRATORY: No cough, wheezing,  No shortness of breath  CARDIOVASCULAR: No chest pain, palpitations, dizziness, or leg swelling  GASTROINTESTINAL: No abdominal or epigastric pain.       MEDICATIONS  (STANDING):  aspirin  chewable 81 milliGRAM(s) Oral daily  chlorhexidine 2% Cloths 1 Application(s) Topical daily  heparin   Injectable 5000 Unit(s) SubCutaneous every 8 hours  influenza  Vaccine (HIGH DOSE) 0.7 milliLiter(s) IntraMuscular once  metoprolol succinate ER 50 milliGRAM(s) Oral daily    MEDICATIONS  (PRN):  acetaminophen     Tablet .. 650 milliGRAM(s) Oral every 6 hours PRN Temp greater or equal to 38C (100.4F), Mild Pain (1 - 3)  aluminum hydroxide/magnesium hydroxide/simethicone Suspension 30 milliLiter(s) Oral every 4 hours PRN Dyspepsia  melatonin 3 milliGRAM(s) Oral at bedtime PRN Insomnia  ondansetron Injectable 4 milliGRAM(s) IV Push every 8 hours PRN Nausea and/or Vomiting        CAPILLARY BLOOD GLUCOSE        I&O's Summary    28 Oct 2022 07:01  -  29 Oct 2022 07:00  --------------------------------------------------------  IN: 340 mL / OUT: 550 mL / NET: -210 mL    29 Oct 2022 07:01  -  29 Oct 2022 18:26  --------------------------------------------------------  IN: 720 mL / OUT: 0 mL / NET: 720 mL        T(C): 36.9 (10-29-22 @ 10:55), Max: 36.9 (10-29-22 @ 10:55)  HR: 71 (10-29-22 @ 10:55) (65 - 89)  BP: 152/72 (10-29-22 @ 10:55) (141/75 - 162/73)  RR: 18 (10-29-22 @ 10:55) (16 - 18)  SpO2: 94% (10-29-22 @ 10:55) (94% - 94%)    PHYSICAL EXAM:  GENERAL: NAD  NECK: Supple, No JVD  CHEST/LUNG: Clear to auscultation bilaterally; No wheezing.  HEART: Regular rate and rhythm; No murmurs, rubs, or gallops  ABDOMEN: Soft, Nontender, Nondistended; Bowel sounds present  EXTREMITIES:   No edema  NEUROLOGY: AAO       LABS:                        9.5    3.21  )-----------( 325      ( 29 Oct 2022 07:37 )             30.0     10-29    140  |  103  |  40<H>  ----------------------------<  104<H>  4.6   |  27  |  1.30    Ca    10.0      29 Oct 2022 07:36  Phos  3.4     10-28  Mg     2.2     10-28    TPro  6.9  /  Alb  3.1<L>  /  TBili  0.2  /  DBili  x   /  AST  17  /  ALT  8<L>  /  AlkPhos  83  10-27            CAPILLARY BLOOD GLUCOSE            RADIOLOGY & ADDITIONAL TESTS:    Imaging Personally Reviewed:    Consultant(s) Notes Reviewed:      Care Discussed with Consultants/Other Providers:    Don Hassan MD, CMD, FACP    [address] [address]  Office Tel: [phone]  Cell: [phone]  
  Follow Up:  PJI    Interval History/ROS:  left knee swelling    Allergies  aloe vera (Unknown)  Cherries (Unknown)  latex (Unknown)  levofloxacin (Unknown)  melon (Unknown)  minocycline (Unknown)  Silvadene (Unknown)  Sulfacetamide Sodium (Unknown)    ANTIMICROBIALS:  cefTRIAXone   IVPB 2000 every 24 hours      OTHER MEDS:  MEDICATIONS  (STANDING):  acetaminophen     Tablet .. 650 every 6 hours PRN  aluminum hydroxide/magnesium hydroxide/simethicone Suspension 30 every 4 hours PRN  aspirin  chewable 81 daily  heparin   Injectable 5000 every 8 hours  influenza  Vaccine (HIGH DOSE) 0.7 once  melatonin 3 at bedtime PRN  metoprolol succinate ER 50 daily  ondansetron Injectable 4 every 8 hours PRN      Vital Signs Last 24 Hrs  T(C): 37 (30 Oct 2022 12:53), Max: 37 (30 Oct 2022 12:53)  T(F): 98.6 (30 Oct 2022 12:53), Max: 98.6 (30 Oct 2022 12:53)  HR: 71 (30 Oct 2022 12:53) (62 - 73)  BP: 125/68 (30 Oct 2022 12:53) (100/56 - 132/75)  BP(mean): --  RR: 18 (30 Oct 2022 12:53) (18 - 18)  SpO2: 95% (30 Oct 2022 12:53) (95% - 100%)    Parameters below as of 30 Oct 2022 12:53  Patient On (Oxygen Delivery Method): room air    PHYSICAL EXAM:  General: WN/WD NAD, Non-toxic  Neurology: A&Ox3, nonfocal  Respiratory: Clear to auscultation bilaterally  CV: RRR, S1S2, no murmurs, rubs or gallops  Abdominal: Soft, Non-tender, non-distended, normal bowel sounds  Extremities: reactive skin changes with peeling lateral of left knee - swelling around left knee  Line Sites: Clear  Skin: No rash                          8.9    3.25  )-----------( 307      ( 30 Oct 2022 07:31 )             27.4   WBC Count: 3.25 (10-30 @ 07:31)  WBC Count: 3.21 (10-29 @ 07:37)  WBC Count: 4.19 (10-28 @ 10:39)  WBC Count: 5.57 (10-27 @ 20:14)  WBC Count: 5.04 (10-27 @ 18:14)    10-30    138  |  103  |  35<H>  ----------------------------<  114<H>  4.5   |  26  |  1.36<H>  Creatinine: 1.36 (10-30 @ 07:26)    Creatinine: 1.30 (10-29 @ 07:36)    Creatinine: 1.60 (10-28 @ 10:39)    Creatinine: 2.02 (10-27 @ 20:14)    Creatinine: 1.04 (10-27 @ 18:14)      Ca    9.8      30 Oct 2022 07:26      MICROBIOLOGY:  Clean Catch Clean Catch (Midstream)  10-27-22   <10,000 CFU/mL Normal Urogenital Irina  --  --    Rapid RVP Result: NotDetec (10-27 @ 18:14)    RADIOLOGY:  images independently viewed  < from: Xray Knee 3 Views, Left (10.29.22 @ 20:02) >  Left TKR with intact hardware. No acute fracture dislocation focal bone   lysis or unusual periosteal reaction. Soft tissue swelling with gas   lucencies lateral to the knee joint and distal femur is nonspecific may   represent postoperative change. Cannot exclude infection/abscess in the   appropriate clinical setting. Recommend CT or MR for additional   evaluation.  Vascular stent graft noted in the lower thigh. Vascular calcification.    IMPRESSION: Status post left TKR. No acute osseous abnormality. No bone   destruction. Nonspecific soft tissue swelling andgas lucency as   described. Correlate clinically for infection and with  CT or MR as   warranted    < end of copied text >      Cayetano Purvis MD; Division of Infectious Disease; Pager: [phone]; nights and weekends: [phone]
Neurology Progress Note    S: Patient seen and examined. No new events overnight. patient denied CP, SOB, HA or pain.     Medication:    MEDICATIONS  (STANDING):  aspirin  chewable 81 milliGRAM(s) Oral daily  cefTRIAXone   IVPB 2000 milliGRAM(s) IV Intermittent every 24 hours  chlorhexidine 2% Cloths 1 Application(s) Topical daily  heparin   Injectable 5000 Unit(s) SubCutaneous every 8 hours  influenza  Vaccine (HIGH DOSE) 0.7 milliLiter(s) IntraMuscular once  metoprolol succinate ER 50 milliGRAM(s) Oral daily  polyethylene glycol 3350 17 Gram(s) Oral daily    MEDICATIONS  (PRN):  acetaminophen     Tablet .. 650 milliGRAM(s) Oral every 6 hours PRN Temp greater or equal to 38C (100.4F), Mild Pain (1 - 3)  aluminum hydroxide/magnesium hydroxide/simethicone Suspension 30 milliLiter(s) Oral every 4 hours PRN Dyspepsia  melatonin 3 milliGRAM(s) Oral at bedtime PRN Insomnia  ondansetron Injectable 4 milliGRAM(s) IV Push every 8 hours PRN Nausea and/or Vomiting      Vitals:    Vital Signs Last 24 Hrs  T(C): 37.4 (11-01-22 @ 05:10), Max: 37.4 (11-01-22 @ 05:10)  T(F): 99.4 (11-01-22 @ 05:10), Max: 99.4 (11-01-22 @ 05:10)  HR: 73 (11-01-22 @ 05:10) (73 - 79)  BP: 128/75 (11-01-22 @ 05:10) (105/61 - 128/75)  BP(mean): --  RR: 18 (11-01-22 @ 05:10) (18 - 18)  SpO2: 94% (11-01-22 @ 05:10) (94% - 95%)          General Exam:   General Appearance: Appropriately dressed and in no acute distress       Head: Normocephalic, atraumatic and no dysmorphic features  Ear, Nose, and Throat: Moist mucous membranes  CVS: S1S2+  Resp: No SOB, no wheeze or rhonchi  Abd: soft NTND  Extremities: No edema, no cyanosis  Skin: No bruises, no rashes     Neurological Exam:  Mental Status: Awake, alert and oriented x 2-3.  Able to follow simple and complex verbal commands. Able to name and repeat. fluent speech. No obvious aphasia or dysarthria noted.   Cranial Nerves: PERRL, EOMI, VFFC, sensation V1-V3 intact,  no obvious facial asymmetry, equal elevation of palate, scm/trap 5/5, tongue is midline on protrusion. no obvious papilledema on fundoscopic exam. hearing is grossly intact.   Motor: BELLE at least 3-4/5   Sensation: Intact to light touch and pinprick throughout. no right/left confusion. no extinction to tactile on DSS.   Reflexes: 1+ throughout at biceps, brachioradialis, triceps, patellars and ankles bilaterally and equal. No clonus. R toe and L toe were both downgoing.  Coordination: No dysmetria on FNF    Gait:deferred     I personally reviewed the below data/images/labs:      CBC Full  -  ( 31 Oct 2022 07:17 )  WBC Count : 3.32 K/uL  RBC Count : 2.77 M/uL  Hemoglobin : 9.4 g/dL  Hematocrit : 29.4 %  Platelet Count - Automated : 290 K/uL  Mean Cell Volume : 106.1 fl  Mean Cell Hemoglobin : 33.9 pg  Mean Cell Hemoglobin Concentration : 32.0 gm/dL  Auto Neutrophil # : x  Auto Lymphocyte # : x  Auto Monocyte # : x  Auto Eosinophil # : x  Auto Basophil # : x  Auto Neutrophil % : x  Auto Lymphocyte % : x  Auto Monocyte % : x  Auto Eosinophil % : x  Auto Basophil % : x    10-31    139  |  104  |  30<H>  ----------------------------<  103<H>  4.4   |  25  |  1.39<H>    Ca    9.7      31 Oct 2022 07:14          < from: CT Head No Cont (10.27.22 @ 18:49) >    ACC: [id number] EXAM:  CT BRAIN                          PROCEDURE DATE:  10/27/2022          INTERPRETATION:  CLINICAL INDICATION: Seizure-like activity. Altered   mental status.    TECHNIQUE: Noncontrast CT of the head was performed.    Multiple contiguous axial images were acquired from the skull base to the   vertex without the administration of intravenous contrast. Coronal and   sagittal reformations were made.    COMPARISON: None.    FINDINGS:  Mild prominence of the sulci and ventricles are consistent with   age-appropriate volume loss. There are hemispheric white matter areas of   low attenuation which are nonspecific but likely related to sequelae of   microvascular disease. 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    Age-indeterminate lacunar infarct right thalamus.    There is no intraparenchymal hematoma, mass effect or midline shift. The   basal cisterns are patent.  No abnormal extra-axial fluid collections are   present.    The visualized paranasal sinuses are clear. The mastoid air cells and   middle ear cavities are clear.    Cranium is intact.        IMPRESSION:    No acute intracranial hemorrhage.    Age-indeterminate lacunar infarct right thalamus. Correlate clinically.   MRI exam recommended as clinically warranted.    --- End of Report ---        KECIA KENT MD; Resident Radiology  This document has been electronically signed.  WERNER ARELLANO MD; Attending Radiologist  This document has been electronically signed. Oct 27 2022  7:23PM    < end of copied text >          Normal REEG  There were no epileptiform abnormalities recorded.             
Neurology Progress Note    S: Patient seen and examined. No new events overnight. patient denied CP, SOB, HA or pain.     Medication:    MEDICATIONS  (STANDING):  aspirin  chewable 81 milliGRAM(s) Oral daily  cefTRIAXone   IVPB 2000 milliGRAM(s) IV Intermittent every 24 hours  chlorhexidine 2% Cloths 1 Application(s) Topical daily  heparin   Injectable 5000 Unit(s) SubCutaneous every 8 hours  influenza  Vaccine (HIGH DOSE) 0.7 milliLiter(s) IntraMuscular once  metoprolol succinate ER 50 milliGRAM(s) Oral daily  polyethylene glycol 3350 17 Gram(s) Oral daily    MEDICATIONS  (PRN):  acetaminophen     Tablet .. 650 milliGRAM(s) Oral every 6 hours PRN Temp greater or equal to 38C (100.4F), Mild Pain (1 - 3)  aluminum hydroxide/magnesium hydroxide/simethicone Suspension 30 milliLiter(s) Oral every 4 hours PRN Dyspepsia  melatonin 3 milliGRAM(s) Oral at bedtime PRN Insomnia  ondansetron Injectable 4 milliGRAM(s) IV Push every 8 hours PRN Nausea and/or Vomiting      Vitals:    Vital Signs Last 24 Hrs  T(C): 36.8 (11-02-22 @ 16:49), Max: 37.6 (11-02-22 @ 04:58)  T(F): 98.2 (11-02-22 @ 16:49), Max: 99.6 (11-02-22 @ 04:58)  HR: 74 (11-02-22 @ 16:49) (64 - 74)  BP: 92/54 (11-02-22 @ 16:49) (92/54 - 117/75)  BP(mean): --  RR: 16 (11-02-22 @ 16:49) (16 - 18)  SpO2: 97% (11-02-22 @ 16:49) (95% - 97%)        General Exam:   General Appearance: Appropriately dressed and in no acute distress       Head: Normocephalic, atraumatic and no dysmorphic features  Ear, Nose, and Throat: Moist mucous membranes  CVS: S1S2+  Resp: No SOB, no wheeze or rhonchi  Abd: soft NTND  Extremities: No edema, no cyanosis  Skin: No bruises, no rashes     Neurological Exam:  Mental Status: Awake, alert and oriented x 2-3.  Able to follow simple and complex verbal commands. Able to name and repeat. fluent speech. No obvious aphasia or dysarthria noted.   Cranial Nerves: PERRL, EOMI, VFFC, sensation V1-V3 intact,  no obvious facial asymmetry, equal elevation of palate, scm/trap 5/5, tongue is midline on protrusion. no obvious papilledema on fundoscopic exam. hearing is grossly intact.   Motor: BELLE at least 3-4/5   Sensation: Intact to light touch and pinprick throughout. no right/left confusion. no extinction to tactile on DSS.   Reflexes: 1+ throughout at biceps, brachioradialis, triceps, patellars and ankles bilaterally and equal. No clonus. R toe and L toe were both downgoing.  Coordination: No dysmetria on FNF    Gait:deferred     I personally reviewed the below data/images/labs:      CBC Full  -  ( 02 Nov 2022 06:37 )  WBC Count : 3.67 K/uL  RBC Count : 2.59 M/uL  Hemoglobin : 8.9 g/dL  Hematocrit : 27.3 %  Platelet Count - Automated : 259 K/uL  Mean Cell Volume : 105.4 fl  Mean Cell Hemoglobin : 34.4 pg  Mean Cell Hemoglobin Concentration : 32.6 gm/dL  Auto Neutrophil # : x  Auto Lymphocyte # : x  Auto Monocyte # : x  Auto Eosinophil # : x  Auto Basophil # : x  Auto Neutrophil % : x  Auto Lymphocyte % : x  Auto Monocyte % : x  Auto Eosinophil % : x  Auto Basophil % : x    11-02    137  |  104  |  26<H>  ----------------------------<  111<H>  4.8   |  24  |  1.29    Ca    9.9      02 Nov 2022 06:44        < from: CT Head No Cont (10.27.22 @ 18:49) >    ACC: [id number] EXAM:  CT BRAIN                          PROCEDURE DATE:  10/27/2022          INTERPRETATION:  CLINICAL INDICATION: Seizure-like activity. Altered   mental status.    TECHNIQUE: Noncontrast CT of the head was performed.    Multiple contiguous axial images were acquired from the skull base to the   vertex without the administration of intravenous contrast. Coronal and   sagittal reformations were made.    COMPARISON: None.    FINDINGS:  Mild prominence of the sulci and ventricles are consistent with   age-appropriate volume loss. There are hemispheric white matter areas of   low attenuation which are nonspecific but likely related to sequelae of   microvascular disease. 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    Age-indeterminate lacunar infarct right thalamus.    There is no intraparenchymal hematoma, mass effect or midline shift. The   basal cisterns are patent.  No abnormal extra-axial fluid collections are   present.    The visualized paranasal sinuses are clear. The mastoid air cells and   middle ear cavities are clear.    Cranium is intact.        IMPRESSION:    No acute intracranial hemorrhage.    Age-indeterminate lacunar infarct right thalamus. Correlate clinically.   MRI exam recommended as clinically warranted.    --- End of Report ---        KECIA KENT MD; Resident Radiology  This document has been electronically signed.  WERNER ARELLANO MD; Attending Radiologist  This document has been electronically signed. Oct 27 2022  7:23PM    < end of copied text >          Normal REEG  There were no epileptiform abnormalities recorded.             
Patient is a 89y old  Female who presents with a chief complaint of concern for seizure activity (30 Oct 2022 16:03)      SUBJECTIVE / OVERNIGHT EVENTS: EEG neg for Sz activity cont CTX through 11/22 for 6 week course 2/2 Right knee infection. DC planning back to [facility]    MEDICATIONS  (STANDING):  aspirin  chewable 81 milliGRAM(s) Oral daily  cefTRIAXone   IVPB 2000 milliGRAM(s) IV Intermittent every 24 hours  chlorhexidine 2% Cloths 1 Application(s) Topical daily  heparin   Injectable 5000 Unit(s) SubCutaneous every 8 hours  influenza  Vaccine (HIGH DOSE) 0.7 milliLiter(s) IntraMuscular once  metoprolol succinate ER 50 milliGRAM(s) Oral daily    MEDICATIONS  (PRN):  acetaminophen     Tablet .. 650 milliGRAM(s) Oral every 6 hours PRN Temp greater or equal to 38C (100.4F), Mild Pain (1 - 3)  aluminum hydroxide/magnesium hydroxide/simethicone Suspension 30 milliLiter(s) Oral every 4 hours PRN Dyspepsia  melatonin 3 milliGRAM(s) Oral at bedtime PRN Insomnia  ondansetron Injectable 4 milliGRAM(s) IV Push every 8 hours PRN Nausea and/or Vomiting      Vital Signs Last 24 Hrs  T(F): 98.3 (10-30-22 @ 17:25), Max: 98.6 (10-30-22 @ 12:53)  HR: 89 (10-30-22 @ 17:25) (62 - 89)  BP: 139/65 (10-30-22 @ 17:25) (100/56 - 139/65)  RR: 18 (10-30-22 @ 17:25) (18 - 18)  SpO2: 96% (10-30-22 @ 17:25) (95% - 100%)  Telemetry:   CAPILLARY BLOOD GLUCOSE        I&O's Summary    29 Oct 2022 07:01  -  30 Oct 2022 07:00  --------------------------------------------------------  IN: 1420 mL / OUT: 0 mL / NET: 1420 mL    30 Oct 2022 07:01  -  30 Oct 2022 20:30  --------------------------------------------------------  IN: 240 mL / OUT: 0 mL / NET: 240 mL        PHYSICAL EXAM:  GENERAL: NAD, well-developed  HEAD:  Atraumatic, Normocephalic  EYES: EOMI, PERRLA, conjunctiva and sclera clear  NECK: Supple, No JVD  CHEST/LUNG: Clear to auscultation bilaterally; No wheeze  HEART: Regular rate and rhythm; No murmurs, rubs, or gallops  ABDOMEN: Soft, Nontender, Nondistended; Bowel sounds present  EXTREMITIES:  2+ Peripheral Pulses, No clubbing, cyanosis, or edema  PSYCH: AAOx3  NEUROLOGY: non-focal  SKIN: No rashes or lesions    LABS:                        8.9    3.25  )-----------( 307      ( 30 Oct 2022 07:31 )             27.4     10-30    138  |  103  |  35<H>  ----------------------------<  114<H>  4.5   |  26  |  1.36<H>    Ca    9.8      30 Oct 2022 07:26                RADIOLOGY & ADDITIONAL TESTS:    Imaging Personally Reviewed:    Consultant(s) Notes Reviewed:      Care Discussed with Consultants/Other Providers:  
Patient is a 89y old  Female who presents with a chief complaint of concern for seizure activity (31 Oct 2022 13:07)      SUBJECTIVE / OVERNIGHT EVENTS: no new events, ptn doesnt want to go back to [facility], seen by NICHELLE miles for Acute rehab, recs are to return back to [facility] , remains on ABx    MEDICATIONS  (STANDING):  aspirin  chewable 81 milliGRAM(s) Oral daily  cefTRIAXone   IVPB 2000 milliGRAM(s) IV Intermittent every 24 hours  chlorhexidine 2% Cloths 1 Application(s) Topical daily  heparin   Injectable 5000 Unit(s) SubCutaneous every 8 hours  influenza  Vaccine (HIGH DOSE) 0.7 milliLiter(s) IntraMuscular once  metoprolol succinate ER 50 milliGRAM(s) Oral daily  polyethylene glycol 3350 17 Gram(s) Oral daily    MEDICATIONS  (PRN):  acetaminophen     Tablet .. 650 milliGRAM(s) Oral every 6 hours PRN Temp greater or equal to 38C (100.4F), Mild Pain (1 - 3)  aluminum hydroxide/magnesium hydroxide/simethicone Suspension 30 milliLiter(s) Oral every 4 hours PRN Dyspepsia  melatonin 3 milliGRAM(s) Oral at bedtime PRN Insomnia  ondansetron Injectable 4 milliGRAM(s) IV Push every 8 hours PRN Nausea and/or Vomiting      Vital Signs Last 24 Hrs  T(F): 98.5 (10-31-22 @ 08:41), Max: 98.5 (10-31-22 @ 08:41)  HR: 79 (10-31-22 @ 08:41) (62 - 89)  BP: 151/84 (10-31-22 @ 08:41) (113/55 - 151/84)  RR: 18 (10-31-22 @ 08:41) (18 - 18)  SpO2: 95% (10-31-22 @ 08:41) (91% - 96%)  Telemetry:   CAPILLARY BLOOD GLUCOSE        I&O's Summary    30 Oct 2022 07:01  -  31 Oct 2022 07:00  --------------------------------------------------------  IN: 240 mL / OUT: 0 mL / NET: 240 mL    31 Oct 2022 07:01  -  31 Oct 2022 15:54  --------------------------------------------------------  IN: 100 mL / OUT: 0 mL / NET: 100 mL        PHYSICAL EXAM:  GENERAL: NAD, well-developed  HEAD:  Atraumatic, Normocephalic  EYES: EOMI, PERRLA, conjunctiva and sclera clear  NECK: Supple, No JVD  CHEST/LUNG: Clear to auscultation bilaterally; No wheeze  HEART: Regular rate and rhythm; No murmurs, rubs, or gallops  ABDOMEN: Soft, Nontender, Nondistended; Bowel sounds present  EXTREMITIES:  2+ Peripheral Pulses, No clubbing, cyanosis, or edema  PSYCH: AAOx3  NEUROLOGY: non-focal  SKIN: No rashes or lesions    LABS:                        9.4    3.32  )-----------( 290      ( 31 Oct 2022 07:17 )             29.4     10-31    139  |  104  |  30<H>  ----------------------------<  103<H>  4.4   |  25  |  1.39<H>    Ca    9.7      31 Oct 2022 07:14                RADIOLOGY & ADDITIONAL TESTS:    Imaging Personally Reviewed:    Consultant(s) Notes Reviewed:      Care Discussed with Consultants/Other Providers:  
Patient is a 89y old  Female who presents with a chief complaint of concern for seizure activity (31 Oct 2022 15:49)      SUBJECTIVE / OVERNIGHT EVENTS: no new events, DC planning    MEDICATIONS  (STANDING):  aspirin  chewable 81 milliGRAM(s) Oral daily  cefTRIAXone   IVPB 2000 milliGRAM(s) IV Intermittent every 24 hours  chlorhexidine 2% Cloths 1 Application(s) Topical daily  heparin   Injectable 5000 Unit(s) SubCutaneous every 8 hours  influenza  Vaccine (HIGH DOSE) 0.7 milliLiter(s) IntraMuscular once  metoprolol succinate ER 50 milliGRAM(s) Oral daily  polyethylene glycol 3350 17 Gram(s) Oral daily    MEDICATIONS  (PRN):  acetaminophen     Tablet .. 650 milliGRAM(s) Oral every 6 hours PRN Temp greater or equal to 38C (100.4F), Mild Pain (1 - 3)  aluminum hydroxide/magnesium hydroxide/simethicone Suspension 30 milliLiter(s) Oral every 4 hours PRN Dyspepsia  melatonin 3 milliGRAM(s) Oral at bedtime PRN Insomnia  ondansetron Injectable 4 milliGRAM(s) IV Push every 8 hours PRN Nausea and/or Vomiting      Vital Signs Last 24 Hrs  T(F): 99.4 (11-01-22 @ 05:10), Max: 99.4 (11-01-22 @ 05:10)  HR: 73 (11-01-22 @ 05:10) (73 - 79)  BP: 128/75 (11-01-22 @ 05:10) (105/61 - 151/84)  RR: 18 (11-01-22 @ 05:10) (18 - 18)  SpO2: 94% (11-01-22 @ 05:10) (94% - 95%)  Telemetry:   CAPILLARY BLOOD GLUCOSE        I&O's Summary    30 Oct 2022 07:01  -  31 Oct 2022 07:00  --------------------------------------------------------  IN: 240 mL / OUT: 0 mL / NET: 240 mL    31 Oct 2022 07:01  -  01 Nov 2022 06:00  --------------------------------------------------------  IN: 340 mL / OUT: 0 mL / NET: 340 mL        PHYSICAL EXAM:  GENERAL: NAD, well-developed  HEAD:  Atraumatic, Normocephalic  EYES: EOMI, PERRLA, conjunctiva and sclera clear  NECK: Supple, No JVD  CHEST/LUNG: Clear to auscultation bilaterally; No wheeze  HEART: Regular rate and rhythm; No murmurs, rubs, or gallops  ABDOMEN: Soft, Nontender, Nondistended; Bowel sounds present  EXTREMITIES:  2+ Peripheral Pulses, No clubbing, cyanosis, or edema  PSYCH: AAOx3  NEUROLOGY: non-focal  SKIN: No rashes or lesions    LABS:                        9.4    3.32  )-----------( 290      ( 31 Oct 2022 07:17 )             29.4     10-31    139  |  104  |  30<H>  ----------------------------<  103<H>  4.4   |  25  |  1.39<H>    Ca    9.7      31 Oct 2022 07:14                RADIOLOGY & ADDITIONAL TESTS:    Imaging Personally Reviewed:    Consultant(s) Notes Reviewed:      Care Discussed with Consultants/Other Providers:  
Patient is a 89y old  Female who presents with a chief complaint of concern for seizure activity (01 Nov 2022 08:34)      SUBJECTIVE / OVERNIGHT EVENTS: ptn is awaiting auth to go back to [facility]    MEDICATIONS  (STANDING):  aspirin  chewable 81 milliGRAM(s) Oral daily  cefTRIAXone   IVPB 2000 milliGRAM(s) IV Intermittent every 24 hours  chlorhexidine 2% Cloths 1 Application(s) Topical daily  heparin   Injectable 5000 Unit(s) SubCutaneous every 8 hours  influenza  Vaccine (HIGH DOSE) 0.7 milliLiter(s) IntraMuscular once  metoprolol succinate ER 50 milliGRAM(s) Oral daily  polyethylene glycol 3350 17 Gram(s) Oral daily    MEDICATIONS  (PRN):  acetaminophen     Tablet .. 650 milliGRAM(s) Oral every 6 hours PRN Temp greater or equal to 38C (100.4F), Mild Pain (1 - 3)  aluminum hydroxide/magnesium hydroxide/simethicone Suspension 30 milliLiter(s) Oral every 4 hours PRN Dyspepsia  melatonin 3 milliGRAM(s) Oral at bedtime PRN Insomnia  ondansetron Injectable 4 milliGRAM(s) IV Push every 8 hours PRN Nausea and/or Vomiting      Vital Signs Last 24 Hrs  T(F): 99.6 (11-02-22 @ 04:58), Max: 99.6 (11-02-22 @ 04:58)  HR: 64 (11-02-22 @ 04:58) (64 - 82)  BP: 117/75 (11-02-22 @ 04:58) (102/64 - 117/75)  RR: 18 (11-02-22 @ 04:58) (18 - 18)  SpO2: 95% (11-02-22 @ 04:58) (95% - 96%)  Telemetry:   CAPILLARY BLOOD GLUCOSE        I&O's Summary    01 Nov 2022 07:01  -  02 Nov 2022 07:00  --------------------------------------------------------  IN: 950 mL / OUT: 0 mL / NET: 950 mL        PHYSICAL EXAM:  GENERAL: NAD, well-developed  HEAD:  Atraumatic, Normocephalic  EYES: EOMI, PERRLA, conjunctiva and sclera clear  NECK: Supple, No JVD  CHEST/LUNG: Clear to auscultation bilaterally; No wheeze  HEART: Regular rate and rhythm; No murmurs, rubs, or gallops  ABDOMEN: Soft, Nontender, Nondistended; Bowel sounds present  EXTREMITIES:  2+ Peripheral Pulses, No clubbing, cyanosis, or edema  PSYCH: AAOx3  NEUROLOGY: non-focal  SKIN: No rashes or lesions    LABS:                        8.9    3.67  )-----------( 259      ( 02 Nov 2022 06:37 )             27.3     11-02    137  |  104  |  26<H>  ----------------------------<  111<H>  4.8   |  24  |  1.29    Ca    9.9      02 Nov 2022 06:44                RADIOLOGY & ADDITIONAL TESTS:    Imaging Personally Reviewed:    Consultant(s) Notes Reviewed:      Care Discussed with Consultants/Other Providers:

## 2023-05-08 NOTE — H&P ADULT - NSICDXFAMILYHX_GEN_ALL_CORE_FT
Continue to take all medications as prescribed  Attend all scheduled medical appointments    Follow up with therapist     If you are experiencing a mental health emergency, please call 911 for emergent care, or one of the following numbers for someone to talk to 24 hours a day, 7 days a week:  [facility] Intervention:  [address] Crisis Intervention: [address] [facility] Crisis Intervention: [phone]  [address] [facility], [address]:  Text PA to 341673  PA's support and referral hotline: 940.520.2266  Suicide Prevention Lifeline:  177.339.7316 FAMILY HISTORY:  No pertinent family history in first degree relatives